# Patient Record
Sex: MALE | Race: BLACK OR AFRICAN AMERICAN | NOT HISPANIC OR LATINO | Employment: OTHER | RURAL
[De-identification: names, ages, dates, MRNs, and addresses within clinical notes are randomized per-mention and may not be internally consistent; named-entity substitution may affect disease eponyms.]

---

## 2019-11-08 ENCOUNTER — HISTORICAL (OUTPATIENT)
Dept: ADMINISTRATIVE | Facility: HOSPITAL | Age: 69
End: 2019-11-08

## 2019-11-11 LAB
LAB AP CLINICAL INFORMATION: NORMAL
LAB AP COMMENTS: NORMAL
LAB AP DIAGNOSIS - HISTORICAL: NORMAL
LAB AP GROSS PATHOLOGY - HISTORICAL: NORMAL
LAB AP SPECIMEN SUBMITTED - HISTORICAL: NORMAL

## 2020-02-18 ENCOUNTER — HISTORICAL (OUTPATIENT)
Dept: ADMINISTRATIVE | Facility: HOSPITAL | Age: 70
End: 2020-02-18

## 2020-02-21 LAB
LAB AP CAP CHECKLIST - HISTORICAL: NORMAL
LAB AP CLINICAL INFORMATION: NORMAL
LAB AP COMMENTS: NORMAL
LAB AP DIAGNOSIS - HISTORICAL: NORMAL
LAB AP GROSS PATHOLOGY - HISTORICAL: NORMAL
LAB AP SPECIMEN SUBMITTED - HISTORICAL: NORMAL

## 2020-10-06 ENCOUNTER — HISTORICAL (OUTPATIENT)
Dept: ADMINISTRATIVE | Facility: HOSPITAL | Age: 70
End: 2020-10-06

## 2020-10-06 LAB — PSA SERPL-MCNC: <0.01 NG/ML (ref 0–4.1)

## 2020-12-16 ENCOUNTER — HISTORICAL (OUTPATIENT)
Dept: ADMINISTRATIVE | Facility: HOSPITAL | Age: 70
End: 2020-12-16

## 2020-12-16 LAB — CRC RECOMMENDATION EXT: NORMAL

## 2020-12-17 LAB

## 2021-01-12 ENCOUNTER — HISTORICAL (OUTPATIENT)
Dept: ADMINISTRATIVE | Facility: HOSPITAL | Age: 71
End: 2021-01-12

## 2021-04-05 ENCOUNTER — OFFICE VISIT (OUTPATIENT)
Dept: UROLOGY | Facility: CLINIC | Age: 71
End: 2021-04-05
Payer: MEDICARE

## 2021-04-05 VITALS
HEIGHT: 73 IN | DIASTOLIC BLOOD PRESSURE: 90 MMHG | TEMPERATURE: 99 F | WEIGHT: 204 LBS | SYSTOLIC BLOOD PRESSURE: 140 MMHG | HEART RATE: 56 BPM | BODY MASS INDEX: 27.04 KG/M2 | OXYGEN SATURATION: 98 %

## 2021-04-05 DIAGNOSIS — N52.9 ERECTILE DYSFUNCTION, UNSPECIFIED ERECTILE DYSFUNCTION TYPE: ICD-10-CM

## 2021-04-05 DIAGNOSIS — C61 PROSTATE CANCER: Primary | ICD-10-CM

## 2021-04-05 PROCEDURE — 3008F BODY MASS INDEX DOCD: CPT | Mod: CPTII,,, | Performed by: UROLOGY

## 2021-04-05 PROCEDURE — 1101F PT FALLS ASSESS-DOCD LE1/YR: CPT | Mod: CPTII,,, | Performed by: UROLOGY

## 2021-04-05 PROCEDURE — 1126F AMNT PAIN NOTED NONE PRSNT: CPT | Mod: ,,, | Performed by: UROLOGY

## 2021-04-05 PROCEDURE — 3288F PR FALLS RISK ASSESSMENT DOCUMENTED: ICD-10-PCS | Mod: CPTII,,, | Performed by: UROLOGY

## 2021-04-05 PROCEDURE — 99999 PR PBB SHADOW E&M-EST. PATIENT-LVL III: CPT | Mod: PBBFAC,,, | Performed by: UROLOGY

## 2021-04-05 PROCEDURE — 99999 PR PBB SHADOW E&M-EST. PATIENT-LVL III: ICD-10-PCS | Mod: PBBFAC,,, | Performed by: UROLOGY

## 2021-04-05 PROCEDURE — 1126F PR PAIN SEVERITY QUANTIFIED, NO PAIN PRESENT: ICD-10-PCS | Mod: ,,, | Performed by: UROLOGY

## 2021-04-05 PROCEDURE — 99213 OFFICE O/P EST LOW 20 MIN: CPT | Mod: PBBFAC | Performed by: UROLOGY

## 2021-04-05 PROCEDURE — 1159F MED LIST DOCD IN RCRD: CPT | Mod: ,,, | Performed by: UROLOGY

## 2021-04-05 PROCEDURE — 1159F PR MEDICATION LIST DOCUMENTED IN MEDICAL RECORD: ICD-10-PCS | Mod: ,,, | Performed by: UROLOGY

## 2021-04-05 PROCEDURE — 99213 PR OFFICE/OUTPT VISIT, EST, LEVL III, 20-29 MIN: ICD-10-PCS | Mod: S$PBB,,, | Performed by: UROLOGY

## 2021-04-05 PROCEDURE — 1101F PR PT FALLS ASSESS DOC 0-1 FALLS W/OUT INJ PAST YR: ICD-10-PCS | Mod: CPTII,,, | Performed by: UROLOGY

## 2021-04-05 PROCEDURE — 99213 OFFICE O/P EST LOW 20 MIN: CPT | Mod: S$PBB,,, | Performed by: UROLOGY

## 2021-04-05 PROCEDURE — 3008F PR BODY MASS INDEX (BMI) DOCUMENTED: ICD-10-PCS | Mod: CPTII,,, | Performed by: UROLOGY

## 2021-04-05 PROCEDURE — 3288F FALL RISK ASSESSMENT DOCD: CPT | Mod: CPTII,,, | Performed by: UROLOGY

## 2021-04-05 RX ORDER — LOSARTAN POTASSIUM 50 MG/1
TABLET ORAL
COMMUNITY
Start: 2021-02-19 | End: 2022-12-08

## 2021-04-05 RX ORDER — METOPROLOL SUCCINATE 50 MG/1
TABLET, EXTENDED RELEASE ORAL
COMMUNITY
Start: 2021-02-19 | End: 2022-11-15 | Stop reason: SDUPTHER

## 2021-04-05 RX ORDER — ASPIRIN 81 MG/1
81 TABLET ORAL DAILY
COMMUNITY
End: 2022-12-08 | Stop reason: SDUPTHER

## 2021-04-05 RX ORDER — ATORVASTATIN CALCIUM 20 MG/1
TABLET, FILM COATED ORAL
COMMUNITY
Start: 2021-02-19 | End: 2021-05-27 | Stop reason: SDUPTHER

## 2021-04-05 RX ORDER — TADALAFIL 20 MG/1
20 TABLET ORAL DAILY
Qty: 30 TABLET | Refills: 6 | Status: SHIPPED | OUTPATIENT
Start: 2021-04-05 | End: 2022-07-20

## 2021-04-05 RX ORDER — CHOLECALCIFEROL (VITAMIN D3) 25 MCG
2000 TABLET ORAL DAILY
COMMUNITY

## 2021-04-05 RX ORDER — LANSOPRAZOLE 30 MG/1
CAPSULE, DELAYED RELEASE ORAL
COMMUNITY
Start: 2021-02-19 | End: 2021-11-08 | Stop reason: SDUPTHER

## 2021-04-19 ENCOUNTER — OFFICE VISIT (OUTPATIENT)
Dept: SPINE | Facility: CLINIC | Age: 71
End: 2021-04-19
Payer: MEDICARE

## 2021-04-19 VITALS — HEIGHT: 70 IN | WEIGHT: 205 LBS | BODY MASS INDEX: 29.35 KG/M2

## 2021-04-19 DIAGNOSIS — M16.11 ARTHRITIS OF RIGHT HIP: ICD-10-CM

## 2021-04-19 DIAGNOSIS — M16.12 ARTHRITIS OF LEFT HIP: ICD-10-CM

## 2021-04-19 DIAGNOSIS — M41.56 SCOLIOSIS OF LUMBAR REGION DUE TO DEGENERATIVE DISEASE OF SPINE IN ADULT: Primary | ICD-10-CM

## 2021-04-19 PROCEDURE — 99214 OFFICE O/P EST MOD 30 MIN: CPT | Mod: S$PBB,,, | Performed by: ORTHOPAEDIC SURGERY

## 2021-04-19 PROCEDURE — 99999 PR PBB SHADOW E&M-EST. PATIENT-LVL III: CPT | Mod: PBBFAC,,, | Performed by: ORTHOPAEDIC SURGERY

## 2021-04-19 PROCEDURE — 99214 PR OFFICE/OUTPT VISIT, EST, LEVL IV, 30-39 MIN: ICD-10-PCS | Mod: S$PBB,,, | Performed by: ORTHOPAEDIC SURGERY

## 2021-04-19 PROCEDURE — 99999 PR PBB SHADOW E&M-EST. PATIENT-LVL III: ICD-10-PCS | Mod: PBBFAC,,, | Performed by: ORTHOPAEDIC SURGERY

## 2021-04-19 PROCEDURE — 3008F BODY MASS INDEX DOCD: CPT | Mod: CPTII,,, | Performed by: ORTHOPAEDIC SURGERY

## 2021-04-19 PROCEDURE — 3008F PR BODY MASS INDEX (BMI) DOCUMENTED: ICD-10-PCS | Mod: CPTII,,, | Performed by: ORTHOPAEDIC SURGERY

## 2021-04-19 PROCEDURE — 99213 OFFICE O/P EST LOW 20 MIN: CPT | Mod: PBBFAC | Performed by: ORTHOPAEDIC SURGERY

## 2021-04-22 ENCOUNTER — TELEPHONE (OUTPATIENT)
Dept: UROLOGY | Facility: CLINIC | Age: 71
End: 2021-04-22

## 2021-05-26 PROBLEM — I10 HYPERTENSION: Status: ACTIVE | Noted: 2021-05-26

## 2021-05-26 PROBLEM — M19.90 OSTEOARTHRITIS: Status: ACTIVE | Noted: 2021-05-26

## 2021-05-26 PROBLEM — K21.9 GASTROESOPHAGEAL REFLUX DISEASE: Status: ACTIVE | Noted: 2021-05-26

## 2021-05-26 PROBLEM — E78.5 DYSLIPIDEMIA: Status: ACTIVE | Noted: 2021-05-26

## 2021-05-26 RX ORDER — TRAVOPROST OPHTHALMIC SOLUTION 0.04 MG/ML
SOLUTION OPHTHALMIC
COMMUNITY
Start: 2021-03-01

## 2021-05-26 RX ORDER — PREGABALIN 50 MG/1
1 CAPSULE ORAL 2 TIMES DAILY
COMMUNITY
Start: 2021-01-28 | End: 2022-12-08

## 2021-05-26 RX ORDER — GABAPENTIN 300 MG/1
1 CAPSULE ORAL 3 TIMES DAILY
COMMUNITY
Start: 2021-01-19 | End: 2022-12-08

## 2021-05-26 RX ORDER — TIZANIDINE 4 MG/1
1 TABLET ORAL EVERY 8 HOURS PRN
COMMUNITY
Start: 2020-12-28 | End: 2022-12-08

## 2021-05-26 RX ORDER — TRAMADOL HYDROCHLORIDE AND ACETAMINOPHEN 37.5; 325 MG/1; MG/1
1 TABLET, FILM COATED ORAL EVERY 12 HOURS PRN
COMMUNITY
Start: 2020-12-28 | End: 2022-12-08

## 2021-05-27 ENCOUNTER — OFFICE VISIT (OUTPATIENT)
Dept: PRIMARY CARE CLINIC | Facility: CLINIC | Age: 71
End: 2021-05-27
Payer: MEDICARE

## 2021-05-27 VITALS
DIASTOLIC BLOOD PRESSURE: 80 MMHG | RESPIRATION RATE: 18 BRPM | WEIGHT: 201 LBS | HEIGHT: 70 IN | SYSTOLIC BLOOD PRESSURE: 122 MMHG | BODY MASS INDEX: 28.77 KG/M2 | OXYGEN SATURATION: 97 %

## 2021-05-27 DIAGNOSIS — I10 HYPERTENSION, UNSPECIFIED TYPE: ICD-10-CM

## 2021-05-27 DIAGNOSIS — M19.90 OSTEOARTHRITIS, UNSPECIFIED OSTEOARTHRITIS TYPE, UNSPECIFIED SITE: ICD-10-CM

## 2021-05-27 DIAGNOSIS — E78.5 DYSLIPIDEMIA: Primary | ICD-10-CM

## 2021-05-27 DIAGNOSIS — K21.9 GASTROESOPHAGEAL REFLUX DISEASE, UNSPECIFIED WHETHER ESOPHAGITIS PRESENT: ICD-10-CM

## 2021-05-27 DIAGNOSIS — Z12.5 PROSTATE CANCER SCREENING: ICD-10-CM

## 2021-05-27 PROCEDURE — 3288F PR FALLS RISK ASSESSMENT DOCUMENTED: ICD-10-PCS | Mod: CPTII,,, | Performed by: FAMILY MEDICINE

## 2021-05-27 PROCEDURE — 99214 PR OFFICE/OUTPT VISIT, EST, LEVL IV, 30-39 MIN: ICD-10-PCS | Mod: ,,, | Performed by: FAMILY MEDICINE

## 2021-05-27 PROCEDURE — 3288F FALL RISK ASSESSMENT DOCD: CPT | Mod: CPTII,,, | Performed by: FAMILY MEDICINE

## 2021-05-27 PROCEDURE — 3008F BODY MASS INDEX DOCD: CPT | Mod: CPTII,,, | Performed by: FAMILY MEDICINE

## 2021-05-27 PROCEDURE — 99214 OFFICE O/P EST MOD 30 MIN: CPT | Mod: ,,, | Performed by: FAMILY MEDICINE

## 2021-05-27 PROCEDURE — 1101F PT FALLS ASSESS-DOCD LE1/YR: CPT | Mod: CPTII,,, | Performed by: FAMILY MEDICINE

## 2021-05-27 PROCEDURE — 1101F PR PT FALLS ASSESS DOC 0-1 FALLS W/OUT INJ PAST YR: ICD-10-PCS | Mod: CPTII,,, | Performed by: FAMILY MEDICINE

## 2021-05-27 PROCEDURE — 3008F PR BODY MASS INDEX (BMI) DOCUMENTED: ICD-10-PCS | Mod: CPTII,,, | Performed by: FAMILY MEDICINE

## 2021-05-27 RX ORDER — ATORVASTATIN CALCIUM 20 MG/1
20 TABLET, FILM COATED ORAL NIGHTLY
Qty: 90 TABLET | Refills: 3 | Status: SHIPPED | OUTPATIENT
Start: 2021-05-27

## 2021-07-06 ENCOUNTER — TELEPHONE (OUTPATIENT)
Dept: UROLOGY | Facility: CLINIC | Age: 71
End: 2021-07-06

## 2021-07-14 ENCOUNTER — OFFICE VISIT (OUTPATIENT)
Dept: UROLOGY | Facility: CLINIC | Age: 71
End: 2021-07-14
Payer: MEDICARE

## 2021-07-14 VITALS
TEMPERATURE: 99 F | OXYGEN SATURATION: 99 % | WEIGHT: 201 LBS | DIASTOLIC BLOOD PRESSURE: 87 MMHG | SYSTOLIC BLOOD PRESSURE: 138 MMHG | HEART RATE: 51 BPM | HEIGHT: 70 IN | BODY MASS INDEX: 28.77 KG/M2

## 2021-07-14 DIAGNOSIS — N52.9 ERECTILE DYSFUNCTION, UNSPECIFIED ERECTILE DYSFUNCTION TYPE: ICD-10-CM

## 2021-07-14 DIAGNOSIS — C61 PROSTATE CANCER: Primary | ICD-10-CM

## 2021-07-14 PROCEDURE — 1101F PR PT FALLS ASSESS DOC 0-1 FALLS W/OUT INJ PAST YR: ICD-10-PCS | Mod: CPTII,,, | Performed by: UROLOGY

## 2021-07-14 PROCEDURE — 3288F FALL RISK ASSESSMENT DOCD: CPT | Mod: CPTII,,, | Performed by: UROLOGY

## 2021-07-14 PROCEDURE — 1101F PT FALLS ASSESS-DOCD LE1/YR: CPT | Mod: CPTII,,, | Performed by: UROLOGY

## 2021-07-14 PROCEDURE — 99213 PR OFFICE/OUTPT VISIT, EST, LEVL III, 20-29 MIN: ICD-10-PCS | Mod: S$PBB,,, | Performed by: UROLOGY

## 2021-07-14 PROCEDURE — 99213 OFFICE O/P EST LOW 20 MIN: CPT | Mod: S$PBB,,, | Performed by: UROLOGY

## 2021-07-14 PROCEDURE — 1159F PR MEDICATION LIST DOCUMENTED IN MEDICAL RECORD: ICD-10-PCS | Mod: ,,, | Performed by: UROLOGY

## 2021-07-14 PROCEDURE — 99214 OFFICE O/P EST MOD 30 MIN: CPT | Mod: PBBFAC | Performed by: UROLOGY

## 2021-07-14 PROCEDURE — 3008F BODY MASS INDEX DOCD: CPT | Mod: CPTII,,, | Performed by: UROLOGY

## 2021-07-14 PROCEDURE — 1159F MED LIST DOCD IN RCRD: CPT | Mod: ,,, | Performed by: UROLOGY

## 2021-07-14 PROCEDURE — 3288F PR FALLS RISK ASSESSMENT DOCUMENTED: ICD-10-PCS | Mod: CPTII,,, | Performed by: UROLOGY

## 2021-07-14 PROCEDURE — 3008F PR BODY MASS INDEX (BMI) DOCUMENTED: ICD-10-PCS | Mod: CPTII,,, | Performed by: UROLOGY

## 2021-07-14 RX ORDER — SILDENAFIL CITRATE 20 MG/1
20 TABLET ORAL 3 TIMES DAILY
Qty: 45 TABLET | Refills: 11 | Status: SHIPPED | OUTPATIENT
Start: 2021-07-14 | End: 2022-07-20

## 2021-10-19 ENCOUNTER — CLINICAL SUPPORT (OUTPATIENT)
Dept: PRIMARY CARE CLINIC | Facility: CLINIC | Age: 71
End: 2021-10-19
Payer: MEDICARE

## 2021-10-19 DIAGNOSIS — Z23 NEED FOR VACCINATION: Primary | ICD-10-CM

## 2021-10-19 DIAGNOSIS — I10 HYPERTENSION, UNSPECIFIED TYPE: ICD-10-CM

## 2021-10-19 DIAGNOSIS — E78.5 DYSLIPIDEMIA: ICD-10-CM

## 2021-10-19 DIAGNOSIS — K21.9 GASTROESOPHAGEAL REFLUX DISEASE, UNSPECIFIED WHETHER ESOPHAGITIS PRESENT: ICD-10-CM

## 2021-10-19 DIAGNOSIS — M19.90 OSTEOARTHRITIS, UNSPECIFIED OSTEOARTHRITIS TYPE, UNSPECIFIED SITE: ICD-10-CM

## 2021-10-19 PROCEDURE — 90662 FLU VACCINE - QUADRIVALENT - HIGH DOSE (65+) PRESERVATIVE FREE IM: ICD-10-PCS | Mod: ,,, | Performed by: FAMILY MEDICINE

## 2021-10-19 PROCEDURE — G0008 ADMIN INFLUENZA VIRUS VAC: HCPCS | Mod: ,,, | Performed by: FAMILY MEDICINE

## 2021-10-19 PROCEDURE — 90662 IIV NO PRSV INCREASED AG IM: CPT | Mod: ,,, | Performed by: FAMILY MEDICINE

## 2021-10-19 PROCEDURE — G0008 FLU VACCINE - QUADRIVALENT - HIGH DOSE (65+) PRESERVATIVE FREE IM: ICD-10-PCS | Mod: ,,, | Performed by: FAMILY MEDICINE

## 2021-11-08 RX ORDER — LANSOPRAZOLE 30 MG/1
30 CAPSULE, DELAYED RELEASE ORAL DAILY
Qty: 90 CAPSULE | Refills: 3 | Status: SHIPPED | OUTPATIENT
Start: 2021-11-08 | End: 2022-06-08 | Stop reason: SDUPTHER

## 2021-11-29 ENCOUNTER — OFFICE VISIT (OUTPATIENT)
Dept: PRIMARY CARE CLINIC | Facility: CLINIC | Age: 71
End: 2021-11-29
Payer: MEDICARE

## 2021-11-29 VITALS
RESPIRATION RATE: 16 BRPM | WEIGHT: 207.38 LBS | SYSTOLIC BLOOD PRESSURE: 134 MMHG | HEIGHT: 70 IN | DIASTOLIC BLOOD PRESSURE: 60 MMHG | TEMPERATURE: 98 F | BODY MASS INDEX: 29.69 KG/M2 | OXYGEN SATURATION: 100 % | HEART RATE: 62 BPM

## 2021-11-29 DIAGNOSIS — E78.5 DYSLIPIDEMIA: ICD-10-CM

## 2021-11-29 DIAGNOSIS — I10 HYPERTENSION, UNSPECIFIED TYPE: Primary | ICD-10-CM

## 2021-11-29 DIAGNOSIS — M15.3 OTHER SECONDARY OSTEOARTHRITIS OF MULTIPLE SITES: ICD-10-CM

## 2021-11-29 DIAGNOSIS — K21.00 GASTROESOPHAGEAL REFLUX DISEASE WITH ESOPHAGITIS, UNSPECIFIED WHETHER HEMORRHAGE: ICD-10-CM

## 2021-11-29 PROCEDURE — 99214 PR OFFICE/OUTPT VISIT, EST, LEVL IV, 30-39 MIN: ICD-10-PCS | Mod: ,,, | Performed by: FAMILY MEDICINE

## 2021-11-29 PROCEDURE — 4010F ACE/ARB THERAPY RXD/TAKEN: CPT | Mod: ,,, | Performed by: FAMILY MEDICINE

## 2021-11-29 PROCEDURE — 4010F PR ACE/ARB THEARPY RXD/TAKEN: ICD-10-PCS | Mod: ,,, | Performed by: FAMILY MEDICINE

## 2021-11-29 PROCEDURE — 99214 OFFICE O/P EST MOD 30 MIN: CPT | Mod: ,,, | Performed by: FAMILY MEDICINE

## 2022-01-13 ENCOUNTER — TELEPHONE (OUTPATIENT)
Dept: UROLOGY | Facility: CLINIC | Age: 72
End: 2022-01-13
Payer: MEDICARE

## 2022-01-13 DIAGNOSIS — C61 PROSTATE CANCER: Primary | ICD-10-CM

## 2022-01-13 NOTE — TELEPHONE ENCOUNTER
----- Message from Abi Ambriz sent at 1/13/2022  9:59 AM CST -----  Regarding: PSA  Pt has appt Tuesday needs PSA ordered .   I called and spoke with the wife.  Informed her that the order has been placed for diagnostic PSA for his upcoming appointment with LAZARO Guido.  He can go to lab anytime between 7am and 4:30pm Monday through Thursday and 7am to 2pm on fridays.  She voiced understanding.

## 2022-01-18 ENCOUNTER — OFFICE VISIT (OUTPATIENT)
Dept: UROLOGY | Facility: CLINIC | Age: 72
End: 2022-01-18
Payer: MEDICARE

## 2022-01-18 VITALS
HEART RATE: 53 BPM | DIASTOLIC BLOOD PRESSURE: 100 MMHG | TEMPERATURE: 98 F | WEIGHT: 205 LBS | BODY MASS INDEX: 27.77 KG/M2 | OXYGEN SATURATION: 98 % | HEIGHT: 72 IN | SYSTOLIC BLOOD PRESSURE: 150 MMHG

## 2022-01-18 DIAGNOSIS — C61 CANCER OF PROSTATE: Primary | ICD-10-CM

## 2022-01-18 DIAGNOSIS — R97.20 ELEVATED PSA: ICD-10-CM

## 2022-01-18 DIAGNOSIS — I10 HYPERTENSION, UNSPECIFIED TYPE: ICD-10-CM

## 2022-01-18 DIAGNOSIS — Z12.5 ENCOUNTER FOR PROSTATE CANCER SCREENING: ICD-10-CM

## 2022-01-18 PROCEDURE — 3008F PR BODY MASS INDEX (BMI) DOCUMENTED: ICD-10-PCS | Mod: CPTII,,, | Performed by: NURSE PRACTITIONER

## 2022-01-18 PROCEDURE — 4010F ACE/ARB THERAPY RXD/TAKEN: CPT | Mod: CPTII,,, | Performed by: NURSE PRACTITIONER

## 2022-01-18 PROCEDURE — 1126F AMNT PAIN NOTED NONE PRSNT: CPT | Mod: CPTII,,, | Performed by: NURSE PRACTITIONER

## 2022-01-18 PROCEDURE — 3288F PR FALLS RISK ASSESSMENT DOCUMENTED: ICD-10-PCS | Mod: CPTII,,, | Performed by: NURSE PRACTITIONER

## 2022-01-18 PROCEDURE — 3080F PR MOST RECENT DIASTOLIC BLOOD PRESSURE >= 90 MM HG: ICD-10-PCS | Mod: CPTII,,, | Performed by: NURSE PRACTITIONER

## 2022-01-18 PROCEDURE — 1159F PR MEDICATION LIST DOCUMENTED IN MEDICAL RECORD: ICD-10-PCS | Mod: CPTII,,, | Performed by: NURSE PRACTITIONER

## 2022-01-18 PROCEDURE — 3008F BODY MASS INDEX DOCD: CPT | Mod: CPTII,,, | Performed by: NURSE PRACTITIONER

## 2022-01-18 PROCEDURE — 99215 OFFICE O/P EST HI 40 MIN: CPT | Mod: PBBFAC | Performed by: NURSE PRACTITIONER

## 2022-01-18 PROCEDURE — 1126F PR PAIN SEVERITY QUANTIFIED, NO PAIN PRESENT: ICD-10-PCS | Mod: CPTII,,, | Performed by: NURSE PRACTITIONER

## 2022-01-18 PROCEDURE — 4010F PR ACE/ARB THEARPY RXD/TAKEN: ICD-10-PCS | Mod: CPTII,,, | Performed by: NURSE PRACTITIONER

## 2022-01-18 PROCEDURE — 1101F PT FALLS ASSESS-DOCD LE1/YR: CPT | Mod: CPTII,,, | Performed by: NURSE PRACTITIONER

## 2022-01-18 PROCEDURE — 1101F PR PT FALLS ASSESS DOC 0-1 FALLS W/OUT INJ PAST YR: ICD-10-PCS | Mod: CPTII,,, | Performed by: NURSE PRACTITIONER

## 2022-01-18 PROCEDURE — 1160F RVW MEDS BY RX/DR IN RCRD: CPT | Mod: CPTII,,, | Performed by: NURSE PRACTITIONER

## 2022-01-18 PROCEDURE — 3288F FALL RISK ASSESSMENT DOCD: CPT | Mod: CPTII,,, | Performed by: NURSE PRACTITIONER

## 2022-01-18 PROCEDURE — 1159F MED LIST DOCD IN RCRD: CPT | Mod: CPTII,,, | Performed by: NURSE PRACTITIONER

## 2022-01-18 PROCEDURE — 1160F PR REVIEW ALL MEDS BY PRESCRIBER/CLIN PHARMACIST DOCUMENTED: ICD-10-PCS | Mod: CPTII,,, | Performed by: NURSE PRACTITIONER

## 2022-01-18 PROCEDURE — 3077F SYST BP >= 140 MM HG: CPT | Mod: CPTII,,, | Performed by: NURSE PRACTITIONER

## 2022-01-18 PROCEDURE — 99214 PR OFFICE/OUTPT VISIT, EST, LEVL IV, 30-39 MIN: ICD-10-PCS | Mod: S$PBB,,, | Performed by: NURSE PRACTITIONER

## 2022-01-18 PROCEDURE — 3077F PR MOST RECENT SYSTOLIC BLOOD PRESSURE >= 140 MM HG: ICD-10-PCS | Mod: CPTII,,, | Performed by: NURSE PRACTITIONER

## 2022-01-18 PROCEDURE — 3080F DIAST BP >= 90 MM HG: CPT | Mod: CPTII,,, | Performed by: NURSE PRACTITIONER

## 2022-01-18 PROCEDURE — 51798 US URINE CAPACITY MEASURE: CPT | Mod: PBBFAC | Performed by: NURSE PRACTITIONER

## 2022-01-18 PROCEDURE — 99214 OFFICE O/P EST MOD 30 MIN: CPT | Mod: S$PBB,,, | Performed by: NURSE PRACTITIONER

## 2022-01-18 RX ORDER — METOPROLOL SUCCINATE 50 MG/1
1 TABLET, EXTENDED RELEASE ORAL DAILY
COMMUNITY
End: 2022-12-08 | Stop reason: SDUPTHER

## 2022-01-18 RX ORDER — LOSARTAN POTASSIUM 50 MG/1
1 TABLET ORAL DAILY
COMMUNITY
End: 2022-12-08

## 2022-01-18 RX ORDER — NAPROXEN SODIUM 220 MG/1
TABLET, FILM COATED ORAL
COMMUNITY

## 2022-01-18 RX ORDER — ATORVASTATIN CALCIUM 20 MG/1
TABLET, FILM COATED ORAL
COMMUNITY
End: 2022-12-08 | Stop reason: SDUPTHER

## 2022-01-18 NOTE — ASSESSMENT & PLAN NOTE
· Uncontrolled, rechecked manually.  Patient asymptomatic  · Patient to call Dr. Mae today for recommendation and further evaluation.  · Keep BP log

## 2022-01-18 NOTE — ASSESSMENT & PLAN NOTE
Lab Results   Component Value Date    PSA <0.010 01/17/2022    PSA <0.010 11/29/2021    PSA <0.010 07/06/2021

## 2022-01-18 NOTE — PROGRESS NOTES
Subjective:       Patient ID: Shad Olvera is a 71 y.o. male.    Chief Complaint: Other (6 month followup appointment for prostate cancer--former Dr Melara pt---New for LAZARO Guido--brought his meds)    7/14/2021 - GUANAKITO:    psa is 0.     Underwent Cap 3+4 s/p RALP 2/18/20    Previous notes below    69 yoweek post op. no new issues. pathology was pT2Nx0, final 3+4 with 26%, negative margins. No leakage. No pads. Attempted cialis but no benefit. incision have healed well.      cialis 20 mg prn.    POC:    x sildenafil 100mg prn  Follow up 6 months with PSA prior    They will follow up in [place]  ---------------------------------------        4/5/2021:   HPI  Here before his scheduled follow up. Wants to discuss ED. Failed max viagra. Wife present at exam.     Plan:       68 yo 6 week post op. no new issues. pathology was pT2Nx0, final 3+4 with 26%, negative margins. going through 1 ppd. not sexually active. incision have healed well. psa pending today.  2/20.. psa has been undetectable. Did not get psa today    cialis 20 mg prn.     PSA in 3 months. Then q 6 months until 5 years out. He will reach out sooner if cialis doesn't work. Offered injection therapy. -----------------    PREVIOUS NOTE:    68 yo here with his wife for evaluation of robotic prostatectomy. Patient was initially planning on having surgery with Dr. Carrasco but his insurance does not cover travel expense. PSA was 5.5 the time of diagnosis his prostate was between 30 and 40 g. He has relatively normal erectile function with the mild IP assess score    assessment  Cap 3+4 s/p RALP 2/18/20    plan  -PSA is 6 months.   ____________________________________________________________________________________________________________________________  _________________________________________________________________________________________________________________________    Mr. Olvera is a pleasant 70 yo AA gentleman who is here today with his wife, for 6 month f/u  appt for CAP, GS 3+4.  PSA 5.5 at diagnosis, RALP 2/18/2020.  PSA prior to visit remains undetectable.  Patient denies voiding complaints, and feels his urgency and nocturia is acceptable.  IPSS 7, PVR 0 ml.    Lab Results       Component                Value               Date                       PSA                      <0.010              01/17/2022                 PSA                      <0.010              11/29/2021                 PSA                      <0.010              07/06/2021   ------------(1/18/2021)-----------------------------               Review of Systems   Constitutional: Negative.    Eyes: Negative for visual disturbance.   Respiratory: Negative.    Cardiovascular: Negative.    Gastrointestinal: Negative.    Endocrine: Negative.    Genitourinary: Positive for urgency. Negative for decreased urine volume, difficulty urinating, dysuria, enuresis, flank pain, frequency, genital sores, hematuria, penile discharge, penile pain, penile swelling, scrotal swelling and testicular pain.        Occassional urgency, sleeps all night when fluids controlled in the evening.   Musculoskeletal: Negative.    Allergic/Immunologic: Negative.    Neurological: Negative.    Hematological: Negative.    Psychiatric/Behavioral: Negative.        Objective:      Physical Exam  Vitals and nursing note reviewed.   Constitutional:       General: He is not in acute distress.     Appearance: Normal appearance. He is not ill-appearing, toxic-appearing or diaphoretic.   Eyes:      General: No scleral icterus.  Cardiovascular:      Rate and Rhythm: Normal rate. Rhythm irregular.      Pulses: Normal pulses.      Heart sounds: Normal heart sounds.   Pulmonary:      Effort: Pulmonary effort is normal.      Breath sounds: Normal breath sounds.   Abdominal:      General: Bowel sounds are normal. There is no distension.      Palpations: Abdomen is soft.      Tenderness: There is no abdominal tenderness. There is no right CVA  tenderness or left CVA tenderness.   Musculoskeletal:      Right lower leg: No edema.      Left lower leg: No edema.   Skin:     General: Skin is warm and dry.      Coloration: Skin is not jaundiced or pale.   Neurological:      General: No focal deficit present.      Mental Status: He is alert and oriented to person, place, and time.   Psychiatric:         Mood and Affect: Mood normal.         Behavior: Behavior normal.         Judgment: Judgment normal.         Assessment:       1. Cancer of prostate    2. Elevated PSA    3. Encounter for prostate cancer screening    4. Hypertension, unspecified type        Plan:       Elevated PSA  Lab Results   Component Value Date    PSA <0.010 01/17/2022    PSA <0.010 11/29/2021    PSA <0.010 07/06/2021       Hypertension  · Uncontrolled, rechecked manually.  Patient asymptomatic  · Patient to call Dr. Mae today for recommendation and further evaluation.  · Keep BP log    Cancer of prostate  · Continue q 6 mos rechecks with PSA  PSA Total (ng/mL)   Date Value   01/17/2022 <0.010

## 2022-01-18 NOTE — PATIENT INSTRUCTIONS
Patient Education       High Blood Pressure ED   General Information   During your visit to the Emergency Department (ED), the doctors found your blood pressure was high. The medical name for high blood pressure is hypertension. Your blood pressure is measured with 2 numbers. For example, you may hear the staff say your blood pressure is 130 over 80. High blood pressure puts you at risk for heart attack, stroke, and kidney disease.  What care is needed at home?   · Call your regular doctor to let them know you were in the ED. Make a follow-up appointment if you were told to. You may need to see your doctor a few times before they can diagnose hypertension.  · Take all your medicines as ordered. Do not stop taking any of your regular medicines without talking to your doctor.  · Learn how to check your blood pressure at home, if your doctor suggests you do this.  When do I need to get emergency help?   · Call for an ambulance right away if:   ? You have signs of a heart attack, which may include:  § Severe chest pain, pressure, or discomfort with:  § Breathing trouble, sweating, upset stomach, or cold, clammy skin.  § Pain in your arms, back, or jaw.  § Worse pain with activity like walking up stairs.  § Fast or irregular heartbeat.  § Feeling dizzy, faint, or weak.  ? You have signs of stroke like sudden:  § Numbness or weakness of the face, arm, or leg, especially on one side of the body.  § Confusion, trouble speaking, or understanding.  § Trouble seeing in one or both eyes.  § Trouble walking, dizziness, loss of balance, or coordination.  § Severe headache with no known cause.  ? You have a seizure or pass out.  ? You have a severe headache with an upset stomach or throwing up.  ? You have sudden, severe back pain.  When do I need to call the doctor?   · You have 2 home blood pressure readings higher than 180/120.  · Your urine is brown or bloody.  · You have new or worsening symptoms.  Last Reviewed Date    2021-04-28  Consumer Information Use and Disclaimer   This information is not specific medical advice and does not replace information you receive from your health care provider. This is only a brief summary of general information. It does NOT include all information about conditions, illnesses, injuries, tests, procedures, treatments, therapies, discharge instructions or life-style choices that may apply to you. You must talk with your health care provider for complete information about your health and treatment options. This information should not be used to decide whether or not to accept your health care providers advice, instructions or recommendations. Only your health care provider has the knowledge and training to provide advice that is right for you.  Copyright   Copyright © 2021 [employer] and its affiliates and/or licensors. All rights reserved.

## 2022-02-15 RX ORDER — METOPROLOL TARTRATE 50 MG/1
50 TABLET ORAL DAILY
Qty: 90 TABLET | Refills: 3 | Status: SHIPPED | OUTPATIENT
Start: 2022-02-15 | End: 2022-12-08 | Stop reason: SDUPTHER

## 2022-06-08 ENCOUNTER — OFFICE VISIT (OUTPATIENT)
Dept: PRIMARY CARE CLINIC | Facility: CLINIC | Age: 72
End: 2022-06-08
Payer: MEDICARE

## 2022-06-08 VITALS
DIASTOLIC BLOOD PRESSURE: 80 MMHG | WEIGHT: 202 LBS | HEART RATE: 58 BPM | SYSTOLIC BLOOD PRESSURE: 130 MMHG | HEIGHT: 73 IN | RESPIRATION RATE: 16 BRPM | BODY MASS INDEX: 26.77 KG/M2

## 2022-06-08 DIAGNOSIS — C61 CANCER OF PROSTATE: ICD-10-CM

## 2022-06-08 DIAGNOSIS — R97.20 ELEVATED PSA: ICD-10-CM

## 2022-06-08 DIAGNOSIS — Z12.5 ENCOUNTER FOR PROSTATE CANCER SCREENING: ICD-10-CM

## 2022-06-08 DIAGNOSIS — I10 HYPERTENSION, UNSPECIFIED TYPE: Primary | ICD-10-CM

## 2022-06-08 DIAGNOSIS — E78.5 DYSLIPIDEMIA: ICD-10-CM

## 2022-06-08 PROCEDURE — 4010F ACE/ARB THERAPY RXD/TAKEN: CPT | Mod: ,,, | Performed by: FAMILY MEDICINE

## 2022-06-08 PROCEDURE — 1101F PR PT FALLS ASSESS DOC 0-1 FALLS W/OUT INJ PAST YR: ICD-10-PCS | Mod: ,,, | Performed by: FAMILY MEDICINE

## 2022-06-08 PROCEDURE — 4010F PR ACE/ARB THEARPY RXD/TAKEN: ICD-10-PCS | Mod: ,,, | Performed by: FAMILY MEDICINE

## 2022-06-08 PROCEDURE — 1159F PR MEDICATION LIST DOCUMENTED IN MEDICAL RECORD: ICD-10-PCS | Mod: ,,, | Performed by: FAMILY MEDICINE

## 2022-06-08 PROCEDURE — 99214 OFFICE O/P EST MOD 30 MIN: CPT | Mod: ,,, | Performed by: FAMILY MEDICINE

## 2022-06-08 PROCEDURE — 1159F MED LIST DOCD IN RCRD: CPT | Mod: ,,, | Performed by: FAMILY MEDICINE

## 2022-06-08 PROCEDURE — 3288F PR FALLS RISK ASSESSMENT DOCUMENTED: ICD-10-PCS | Mod: ,,, | Performed by: FAMILY MEDICINE

## 2022-06-08 PROCEDURE — 3075F SYST BP GE 130 - 139MM HG: CPT | Mod: ,,, | Performed by: FAMILY MEDICINE

## 2022-06-08 PROCEDURE — 3008F BODY MASS INDEX DOCD: CPT | Mod: ,,, | Performed by: FAMILY MEDICINE

## 2022-06-08 PROCEDURE — 3079F PR MOST RECENT DIASTOLIC BLOOD PRESSURE 80-89 MM HG: ICD-10-PCS | Mod: ,,, | Performed by: FAMILY MEDICINE

## 2022-06-08 PROCEDURE — 3288F FALL RISK ASSESSMENT DOCD: CPT | Mod: ,,, | Performed by: FAMILY MEDICINE

## 2022-06-08 PROCEDURE — 1101F PT FALLS ASSESS-DOCD LE1/YR: CPT | Mod: ,,, | Performed by: FAMILY MEDICINE

## 2022-06-08 PROCEDURE — 3075F PR MOST RECENT SYSTOLIC BLOOD PRESS GE 130-139MM HG: ICD-10-PCS | Mod: ,,, | Performed by: FAMILY MEDICINE

## 2022-06-08 PROCEDURE — 3079F DIAST BP 80-89 MM HG: CPT | Mod: ,,, | Performed by: FAMILY MEDICINE

## 2022-06-08 PROCEDURE — 99214 PR OFFICE/OUTPT VISIT, EST, LEVL IV, 30-39 MIN: ICD-10-PCS | Mod: ,,, | Performed by: FAMILY MEDICINE

## 2022-06-08 PROCEDURE — 3008F PR BODY MASS INDEX (BMI) DOCUMENTED: ICD-10-PCS | Mod: ,,, | Performed by: FAMILY MEDICINE

## 2022-06-08 RX ORDER — LANSOPRAZOLE 30 MG/1
30 CAPSULE, DELAYED RELEASE ORAL DAILY
Qty: 90 CAPSULE | Refills: 3 | Status: SHIPPED | OUTPATIENT
Start: 2022-06-08 | End: 2022-11-15 | Stop reason: SDUPTHER

## 2022-06-08 NOTE — PROGRESS NOTES
Subjective:      Patient ID: Shad Olvera is a 72 y.o. male.    Chief Complaint: No chief complaint on file.    Shad Olvera a 72 y.o. male presents for follow up on all regular problems which are reviewed and discussed.     Problem List Items Addressed This Visit        Cardiac/Vascular    Hypertension - Primary    Dyslipidemia       Renal/    Elevated PSA    RESOLVED: Encounter for prostate cancer screening       Oncology    Cancer of prostate    Overview     CAP, GS 3+4.  PSA 5.5 at diagnosis, RALP 2/18/2020.  PSA prior to visit remains undetectable.                 Past Medical History:  Past Medical History:   Diagnosis Date    Cancer of prostate     Elevated PSA     Hypertension      Past Surgical History:   Procedure Laterality Date    PROSTATE SURGERY      TRIGGER FINGER RELEASE       Review of patient's allergies indicates:  No Known Allergies  Current Outpatient Medications on File Prior to Visit   Medication Sig Dispense Refill    aspirin (ECOTRIN) 81 MG EC tablet Take 81 mg by mouth once daily.      atorvastatin (LIPITOR) 20 MG tablet Take 1 tablet (20 mg total) by mouth every evening. 90 tablet 3    calcium carbonate/vitamin D3 (VITAMIN D-3 ORAL) Take by mouth.      metoprolol succinate (TOPROL-XL) 50 MG 24 hr tablet       [DISCONTINUED] lansoprazole (PREVACID) 30 MG capsule Take 1 capsule (30 mg total) by mouth once daily. 90 capsule 3    aspirin 81 MG Chew 1 tablet      atorvastatin (LIPITOR) 20 MG tablet 1 tablet      gabapentin (NEURONTIN) 300 MG capsule Take 1 capsule by mouth 3 (three) times daily.      losartan (COZAAR) 50 MG tablet       losartan (COZAAR) 50 MG tablet Take 1 tablet by mouth once daily.      metoprolol succinate (TOPROL-XL) 50 MG 24 hr tablet Take 1 tablet by mouth once daily.      metoprolol tartrate (LOPRESSOR) 50 MG tablet Take 1 tablet (50 mg total) by mouth once daily. (Patient not taking: Reported on 6/8/2022) 90 tablet 3    pregabalin (LYRICA) 50 MG capsule  Take 1 capsule by mouth 2 (two) times a day.      sildenafil (REVATIO) 20 mg Tab Take 1 tablet (20 mg total) by mouth 3 (three) times daily. (Patient not taking: Reported on 6/8/2022) 45 tablet 11    tadalafiL (CIALIS) 20 MG Tab Take 1 tablet (20 mg total) by mouth once daily. 30 tablet 6    tiZANidine (ZANAFLEX) 4 MG tablet Take 1 tablet by mouth every 8 (eight) hours as needed.      tramadol-acetaminophen 37.5-325 mg (ULTRACET) 37.5-325 mg Tab Take 1 tablet by mouth every 12 (twelve) hours as needed.      travoprost (TRAVATAN Z) 0.004 % ophthalmic solution       vitamin D (VITAMIN D3) 1000 units Tab Take 2,000 Units by mouth once daily.       No current facility-administered medications on file prior to visit.     Social History     Socioeconomic History    Marital status:    Tobacco Use    Smoking status: Never Smoker    Smokeless tobacco: Never Used   Substance and Sexual Activity    Alcohol use: Never    Drug use: Never    Sexual activity: Not Currently     Family History   Problem Relation Age of Onset    Heart disease Father     Stroke Father     Heart disease Mother     Diabetes Mother        Review of Systems   Constitutional: Negative.  Negative for activity change and unexpected weight change.   HENT: Negative for congestion, ear pain, hearing loss, nosebleeds, rhinorrhea and trouble swallowing.    Eyes: Negative for pain, discharge, itching and visual disturbance.   Respiratory: Negative for chest tightness and wheezing.    Cardiovascular: Negative for chest pain and palpitations.   Gastrointestinal: Negative for abdominal distention, blood in stool, constipation, diarrhea and vomiting.   Endocrine: Negative for cold intolerance, heat intolerance, polydipsia and polyuria.   Genitourinary: Negative for difficulty urinating, hematuria and urgency.   Musculoskeletal: Negative for arthralgias, joint swelling and neck pain.   Neurological: Negative for dizziness, weakness and headaches.  "  Psychiatric/Behavioral: Negative for confusion and dysphoric mood.       Objective:     /80 (BP Location: Left arm, Patient Position: Sitting, BP Method: Large (Manual))   Pulse (!) 58   Resp 16   Ht 6' 1" (1.854 m)   Wt 91.6 kg (202 lb)   BMI 26.65 kg/m²     Physical Exam  Constitutional:       Appearance: Normal appearance. He is obese.   HENT:      Head: Normocephalic and atraumatic.      Right Ear: External ear normal.      Left Ear: External ear normal.      Nose: Nose normal.      Mouth/Throat:      Mouth: Mucous membranes are moist.      Pharynx: Oropharynx is clear.   Eyes:      Pupils: Pupils are equal, round, and reactive to light.   Cardiovascular:      Rate and Rhythm: Normal rate and regular rhythm.      Heart sounds: Normal heart sounds.   Pulmonary:      Effort: Pulmonary effort is normal.      Breath sounds: Normal breath sounds.   Abdominal:      Palpations: Abdomen is soft.   Musculoskeletal:         General: Normal range of motion.      Cervical back: Normal range of motion and neck supple.   Skin:     General: Skin is warm and dry.   Neurological:      General: No focal deficit present.      Mental Status: He is alert.   Psychiatric:         Mood and Affect: Mood normal.         Behavior: Behavior normal.         Thought Content: Thought content normal.         Judgment: Judgment normal.       Assessment:     1. Hypertension, unspecified type    2. Dyslipidemia    3. Elevated PSA    4. Encounter for prostate cancer screening    5. Cancer of prostate        Plan:     Problem List Items Addressed This Visit        Cardiac/Vascular    Hypertension - Primary    Dyslipidemia       Renal/    Elevated PSA    RESOLVED: Encounter for prostate cancer screening       Oncology    Cancer of prostate        No follow-ups on file.  6m fu plus lab    I am having Shad Olvera maintain his metoprolol succinate, losartan, aspirin, calcium carbonate/vitamin D3 (VITAMIN D-3 ORAL), vitamin D, tadalafiL, " gabapentin, pregabalin, tiZANidine, tramadol-acetaminophen 37.5-325 mg, travoprost, atorvastatin, sildenafil, atorvastatin, aspirin, metoprolol succinate, losartan, metoprolol tartrate, and lansoprazole.    Diagnoses and all orders for this visit:    Hypertension, unspecified type    Dyslipidemia    Elevated PSA    Encounter for prostate cancer screening    Cancer of prostate    Other orders  -     lansoprazole (PREVACID) 30 MG capsule; Take 1 capsule (30 mg total) by mouth once daily.      Medications Ordered This Encounter   Medications    lansoprazole (PREVACID) 30 MG capsule     Sig: Take 1 capsule (30 mg total) by mouth once daily.     Dispense:  90 capsule     Refill:  3     [unfilled]  No orders of the defined types were placed in this encounter.

## 2022-07-19 DIAGNOSIS — C61 CANCER OF PROSTATE: ICD-10-CM

## 2022-07-19 DIAGNOSIS — C61 PROSTATE CANCER: Primary | ICD-10-CM

## 2022-07-20 ENCOUNTER — OFFICE VISIT (OUTPATIENT)
Dept: UROLOGY | Facility: CLINIC | Age: 72
End: 2022-07-20
Payer: MEDICARE

## 2022-07-20 VITALS
SYSTOLIC BLOOD PRESSURE: 122 MMHG | OXYGEN SATURATION: 98 % | TEMPERATURE: 99 F | WEIGHT: 202 LBS | DIASTOLIC BLOOD PRESSURE: 80 MMHG | HEIGHT: 73 IN | BODY MASS INDEX: 26.77 KG/M2 | HEART RATE: 58 BPM

## 2022-07-20 DIAGNOSIS — C61 CANCER OF PROSTATE: Primary | ICD-10-CM

## 2022-07-20 DIAGNOSIS — R97.20 ELEVATED PSA: ICD-10-CM

## 2022-07-20 DIAGNOSIS — N52.31 ERECTILE DYSFUNCTION AFTER RADICAL PROSTATECTOMY: ICD-10-CM

## 2022-07-20 DIAGNOSIS — I10 HYPERTENSION, UNSPECIFIED TYPE: ICD-10-CM

## 2022-07-20 PROCEDURE — 1159F MED LIST DOCD IN RCRD: CPT | Mod: CPTII,,, | Performed by: NURSE PRACTITIONER

## 2022-07-20 PROCEDURE — 3008F PR BODY MASS INDEX (BMI) DOCUMENTED: ICD-10-PCS | Mod: CPTII,,, | Performed by: NURSE PRACTITIONER

## 2022-07-20 PROCEDURE — 1101F PT FALLS ASSESS-DOCD LE1/YR: CPT | Mod: CPTII,,, | Performed by: NURSE PRACTITIONER

## 2022-07-20 PROCEDURE — 3079F PR MOST RECENT DIASTOLIC BLOOD PRESSURE 80-89 MM HG: ICD-10-PCS | Mod: CPTII,,, | Performed by: NURSE PRACTITIONER

## 2022-07-20 PROCEDURE — 1126F PR PAIN SEVERITY QUANTIFIED, NO PAIN PRESENT: ICD-10-PCS | Mod: CPTII,,, | Performed by: NURSE PRACTITIONER

## 2022-07-20 PROCEDURE — 3074F SYST BP LT 130 MM HG: CPT | Mod: CPTII,,, | Performed by: NURSE PRACTITIONER

## 2022-07-20 PROCEDURE — 1101F PR PT FALLS ASSESS DOC 0-1 FALLS W/OUT INJ PAST YR: ICD-10-PCS | Mod: CPTII,,, | Performed by: NURSE PRACTITIONER

## 2022-07-20 PROCEDURE — 3079F DIAST BP 80-89 MM HG: CPT | Mod: CPTII,,, | Performed by: NURSE PRACTITIONER

## 2022-07-20 PROCEDURE — 1159F PR MEDICATION LIST DOCUMENTED IN MEDICAL RECORD: ICD-10-PCS | Mod: CPTII,,, | Performed by: NURSE PRACTITIONER

## 2022-07-20 PROCEDURE — 99215 OFFICE O/P EST HI 40 MIN: CPT | Mod: PBBFAC | Performed by: NURSE PRACTITIONER

## 2022-07-20 PROCEDURE — 3288F PR FALLS RISK ASSESSMENT DOCUMENTED: ICD-10-PCS | Mod: CPTII,,, | Performed by: NURSE PRACTITIONER

## 2022-07-20 PROCEDURE — 3008F BODY MASS INDEX DOCD: CPT | Mod: CPTII,,, | Performed by: NURSE PRACTITIONER

## 2022-07-20 PROCEDURE — 1160F PR REVIEW ALL MEDS BY PRESCRIBER/CLIN PHARMACIST DOCUMENTED: ICD-10-PCS | Mod: CPTII,,, | Performed by: NURSE PRACTITIONER

## 2022-07-20 PROCEDURE — 1126F AMNT PAIN NOTED NONE PRSNT: CPT | Mod: CPTII,,, | Performed by: NURSE PRACTITIONER

## 2022-07-20 PROCEDURE — 4010F PR ACE/ARB THEARPY RXD/TAKEN: ICD-10-PCS | Mod: CPTII,,, | Performed by: NURSE PRACTITIONER

## 2022-07-20 PROCEDURE — 3288F FALL RISK ASSESSMENT DOCD: CPT | Mod: CPTII,,, | Performed by: NURSE PRACTITIONER

## 2022-07-20 PROCEDURE — 99214 PR OFFICE/OUTPT VISIT, EST, LEVL IV, 30-39 MIN: ICD-10-PCS | Mod: S$PBB,,, | Performed by: NURSE PRACTITIONER

## 2022-07-20 PROCEDURE — 3074F PR MOST RECENT SYSTOLIC BLOOD PRESSURE < 130 MM HG: ICD-10-PCS | Mod: CPTII,,, | Performed by: NURSE PRACTITIONER

## 2022-07-20 PROCEDURE — 99214 OFFICE O/P EST MOD 30 MIN: CPT | Mod: S$PBB,,, | Performed by: NURSE PRACTITIONER

## 2022-07-20 PROCEDURE — 1160F RVW MEDS BY RX/DR IN RCRD: CPT | Mod: CPTII,,, | Performed by: NURSE PRACTITIONER

## 2022-07-20 PROCEDURE — 4010F ACE/ARB THERAPY RXD/TAKEN: CPT | Mod: CPTII,,, | Performed by: NURSE PRACTITIONER

## 2022-07-20 RX ORDER — VALSARTAN 320 MG/1
320 TABLET ORAL DAILY
COMMUNITY

## 2022-07-20 NOTE — ASSESSMENT & PLAN NOTE
· Lab Results   Component Value Date    PSA <0.010 07/19/2022    PSA <0.010 01/17/2022    PSA <0.010 11/29/2021   · RETURN TO CLINIC 6 MOS FOR RECHECK WITH PSA PRIOR TO VISIT, REMAINS IMMEASURABLE.

## 2022-07-20 NOTE — PROGRESS NOTES
Subjective:       Patient ID: Shad Olvera is a 72 y.o. male.    Chief Complaint: Other (6 month follow up visit cancer of prostate--brought medicines with him)    7/14/2021 - GUANAKITO:    psa is 0.     Underwent Cap 3+4 s/p RALP 2/18/20    Previous notes below    69 yoweek post op. no new issues. pathology was pT2Nx0, final 3+4 with 26%, negative margins. No leakage. No pads. Attempted cialis but no benefit. incision have healed well.      cialis 20 mg prn.    POC:    x sildenafil 100mg prn  Follow up 6 months with PSA prior    They will follow up in [place]  ---------------------------------------        4/5/2021:   HPI  Here before his scheduled follow up. Wants to discuss ED. Failed max viagra. Wife present at exam.     Plan:       68 yo 6 week post op. no new issues. pathology was pT2Nx0, final 3+4 with 26%, negative margins. going through 1 ppd. not sexually active. incision have healed well. psa pending today.  2/20.. psa has been undetectable. Did not get psa today    cialis 20 mg prn.     PSA in 3 months. Then q 6 months until 5 years out. He will reach out sooner if cialis doesn't work. Offered injection therapy. -----------------    PREVIOUS NOTE:    68 yo here with his wife for evaluation of robotic prostatectomy. Patient was initially planning on having surgery with Dr. Carrasco but his insurance does not cover travel expense. PSA was 5.5 the time of diagnosis his prostate was between 30 and 40 g. He has relatively normal erectile function with the mild IP assess score    assessment  Cap 3+4 s/p RALP 2/18/20    plan  -PSA is 6 months.   ____________________________________________________________________________________________________________________________  _________________________________________________________________________________________________________________________    Mr. Olvera is a pleasant 72 yo AA gentleman who is here today with his wife, for 6 month f/u appt for CAP, GS 3+4.  PSA 5.5 at  "diagnosis, RALP 2/18/2020.  PSA prior to visit remains undetectable.  Patient denies voiding complaints, and feels his urgency and nocturia is acceptable.  IPSS 7, PVR 0 ml.    Lab Results       Component                Value               Date                       PSA                      <0.010              01/17/2022                 PSA                      <0.010              11/29/2021                 PSA                      <0.010              07/06/2021     ------------(1/18/2021)-------------------------------------------------------------------    Ms. Olvera has returned to clinic today for 6 month f/u for CAP  3+4 s/p RALP 2/18/20.  PSA remains immeasurable since initial treatment.  Originally diagnosed November of 2019.  Denies weight loss, hematuria, incontinence.  No change in general health or recent hospitalizations.  He feels he is "feeling very well overall".  Patient states sildenafil and cialis were not effective for him.  We discussed alternative treatment.  He is going to discuss penile pump with his wife and let us know.        PSA HISTORY:              PSA    <0.010 ON 07/19/2022   PSA <0.010 01/17/2022   PSA <0.010 11/29/2021   PSA <0.010 07/06/2021  (7/19/2022)----------------------------------------------------------------                        Review of Systems   Constitutional: Negative.    Eyes: Negative for visual disturbance.   Respiratory: Negative.    Cardiovascular: Negative.    Gastrointestinal: Negative.    Endocrine: Negative.    Genitourinary: Negative for decreased urine volume, difficulty urinating, dysuria, enuresis, flank pain, frequency, genital sores, hematuria, penile discharge, penile pain, penile swelling, scrotal swelling, testicular pain and urgency.        Occassional urgency, sleeps all night when fluids controlled in the evening.   Musculoskeletal: Negative.    Allergic/Immunologic: Negative.    Neurological: Negative.    Hematological: Negative.  "   Psychiatric/Behavioral: Negative.        Objective:      Physical Exam  Vitals and nursing note reviewed.   Constitutional:       General: He is not in acute distress.     Appearance: Normal appearance. He is not ill-appearing, toxic-appearing or diaphoretic.   Eyes:      General: No scleral icterus.  Cardiovascular:      Rate and Rhythm: Normal rate and regular rhythm.      Pulses: Normal pulses.      Heart sounds: Normal heart sounds.   Pulmonary:      Effort: Pulmonary effort is normal.      Breath sounds: Normal breath sounds.   Abdominal:      General: Bowel sounds are normal. There is no distension.      Palpations: Abdomen is soft.      Tenderness: There is no abdominal tenderness. There is no right CVA tenderness, left CVA tenderness or guarding.   Musculoskeletal:      Right lower leg: No edema.      Left lower leg: No edema.   Skin:     General: Skin is warm and dry.      Capillary Refill: Capillary refill takes less than 2 seconds.      Coloration: Skin is not jaundiced or pale.      Findings: No bruising, erythema, lesion or rash.   Neurological:      Mental Status: He is alert and oriented to person, place, and time. Mental status is at baseline.   Psychiatric:         Mood and Affect: Mood normal.         Behavior: Behavior normal.         Thought Content: Thought content normal.         Judgment: Judgment normal.         Assessment:       1. Cancer of prostate    2. Hypertension, unspecified type    3. Elevated PSA    4. Erectile dysfunction after radical prostatectomy        Plan:       Cancer of prostate  · Lab Results   Component Value Date    PSA <0.010 07/19/2022    PSA <0.010 01/17/2022    PSA <0.010 11/29/2021   · RETURN TO CLINIC 6 MOS FOR RECHECK WITH PSA PRIOR TO VISIT, REMAINS IMMEASURABLE.    Hypertension  CONTROLLED    Erectile dysfunction after radical prostatectomy  · DISCONTINUE SILDENAFIL AND CIALIS  · PATIENT TO SPEAK WITH WIFE ABOUT PUMP, HE WILL LET US KNOW.  HE IS NOT INTERESTED  ABOUT PENILE INJECTIONS HOWEVER.

## 2022-07-20 NOTE — ASSESSMENT & PLAN NOTE
· DISCONTINUE SILDENAFIL AND CIALIS  · PATIENT TO SPEAK WITH WIFE ABOUT PUMP, HE WILL LET US KNOW.  HE IS NOT INTERESTED ABOUT PENILE INJECTIONS HOWEVER.

## 2022-11-09 DIAGNOSIS — Z71.89 COMPLEX CARE COORDINATION: ICD-10-CM

## 2022-11-15 RX ORDER — METOPROLOL SUCCINATE 50 MG/1
50 TABLET, EXTENDED RELEASE ORAL DAILY
Qty: 90 TABLET | Refills: 3 | Status: SHIPPED | OUTPATIENT
Start: 2022-11-15 | End: 2022-12-08 | Stop reason: SDUPTHER

## 2022-11-15 RX ORDER — LANSOPRAZOLE 30 MG/1
30 CAPSULE, DELAYED RELEASE ORAL DAILY
Qty: 90 CAPSULE | Refills: 3 | Status: SHIPPED | OUTPATIENT
Start: 2022-11-15 | End: 2024-02-05

## 2022-12-08 ENCOUNTER — OFFICE VISIT (OUTPATIENT)
Dept: PRIMARY CARE CLINIC | Facility: CLINIC | Age: 72
End: 2022-12-08
Payer: MEDICARE

## 2022-12-08 VITALS
DIASTOLIC BLOOD PRESSURE: 72 MMHG | BODY MASS INDEX: 25.84 KG/M2 | HEIGHT: 73 IN | RESPIRATION RATE: 18 BRPM | SYSTOLIC BLOOD PRESSURE: 120 MMHG | HEART RATE: 60 BPM | OXYGEN SATURATION: 98 % | WEIGHT: 195 LBS

## 2022-12-08 DIAGNOSIS — M15.3 OTHER SECONDARY OSTEOARTHRITIS OF MULTIPLE SITES: ICD-10-CM

## 2022-12-08 DIAGNOSIS — K21.00 GASTROESOPHAGEAL REFLUX DISEASE WITH ESOPHAGITIS, UNSPECIFIED WHETHER HEMORRHAGE: ICD-10-CM

## 2022-12-08 DIAGNOSIS — N52.31 ERECTILE DYSFUNCTION AFTER RADICAL PROSTATECTOMY: ICD-10-CM

## 2022-12-08 DIAGNOSIS — E78.5 DYSLIPIDEMIA: ICD-10-CM

## 2022-12-08 DIAGNOSIS — I10 HYPERTENSION, UNSPECIFIED TYPE: Primary | ICD-10-CM

## 2022-12-08 PROCEDURE — 1159F MED LIST DOCD IN RCRD: CPT | Mod: ,,, | Performed by: FAMILY MEDICINE

## 2022-12-08 PROCEDURE — 4010F ACE/ARB THERAPY RXD/TAKEN: CPT | Mod: ,,, | Performed by: FAMILY MEDICINE

## 2022-12-08 PROCEDURE — 4010F PR ACE/ARB THEARPY RXD/TAKEN: ICD-10-PCS | Mod: ,,, | Performed by: FAMILY MEDICINE

## 2022-12-08 PROCEDURE — 3074F PR MOST RECENT SYSTOLIC BLOOD PRESSURE < 130 MM HG: ICD-10-PCS | Mod: ,,, | Performed by: FAMILY MEDICINE

## 2022-12-08 PROCEDURE — 1159F PR MEDICATION LIST DOCUMENTED IN MEDICAL RECORD: ICD-10-PCS | Mod: ,,, | Performed by: FAMILY MEDICINE

## 2022-12-08 PROCEDURE — 3008F PR BODY MASS INDEX (BMI) DOCUMENTED: ICD-10-PCS | Mod: ,,, | Performed by: FAMILY MEDICINE

## 2022-12-08 PROCEDURE — 3008F BODY MASS INDEX DOCD: CPT | Mod: ,,, | Performed by: FAMILY MEDICINE

## 2022-12-08 PROCEDURE — 3078F DIAST BP <80 MM HG: CPT | Mod: ,,, | Performed by: FAMILY MEDICINE

## 2022-12-08 PROCEDURE — 99214 PR OFFICE/OUTPT VISIT, EST, LEVL IV, 30-39 MIN: ICD-10-PCS | Mod: ,,, | Performed by: FAMILY MEDICINE

## 2022-12-08 PROCEDURE — 1101F PT FALLS ASSESS-DOCD LE1/YR: CPT | Mod: ,,, | Performed by: FAMILY MEDICINE

## 2022-12-08 PROCEDURE — 3288F FALL RISK ASSESSMENT DOCD: CPT | Mod: ,,, | Performed by: FAMILY MEDICINE

## 2022-12-08 PROCEDURE — 1101F PR PT FALLS ASSESS DOC 0-1 FALLS W/OUT INJ PAST YR: ICD-10-PCS | Mod: ,,, | Performed by: FAMILY MEDICINE

## 2022-12-08 PROCEDURE — 3288F PR FALLS RISK ASSESSMENT DOCUMENTED: ICD-10-PCS | Mod: ,,, | Performed by: FAMILY MEDICINE

## 2022-12-08 PROCEDURE — 99214 OFFICE O/P EST MOD 30 MIN: CPT | Mod: ,,, | Performed by: FAMILY MEDICINE

## 2022-12-08 PROCEDURE — 3074F SYST BP LT 130 MM HG: CPT | Mod: ,,, | Performed by: FAMILY MEDICINE

## 2022-12-08 PROCEDURE — 3078F PR MOST RECENT DIASTOLIC BLOOD PRESSURE < 80 MM HG: ICD-10-PCS | Mod: ,,, | Performed by: FAMILY MEDICINE

## 2022-12-08 RX ORDER — METOPROLOL SUCCINATE 50 MG/1
50 TABLET, EXTENDED RELEASE ORAL DAILY
Qty: 90 TABLET | Refills: 3 | Status: SHIPPED | OUTPATIENT
Start: 2022-12-08

## 2022-12-08 NOTE — PROGRESS NOTES
Subjective:      Patient ID: Shad Olvera is a 72 y.o. male.    Chief Complaint: Follow-up (6mon. Ck-up)    Shad Olvera a 72 y.o. male presents for follow up on all regular problems which are reviewed and discussed.     Problem List Items Addressed This Visit          Cardiac/Vascular    Hypertension - Primary    Dyslipidemia       Renal/    Erectile dysfunction after radical prostatectomy    Overview     SILDENAFIL AND CIALIS WERE INEFFECTIVE            GI    Gastroesophageal reflux disease       Orthopedic    Osteoarthritis       Past Medical History:  Past Medical History:   Diagnosis Date    Cancer of prostate     Elevated PSA     Erectile dysfunction after radical prostatectomy 7/20/2022    Hypertension      Past Surgical History:   Procedure Laterality Date    PROSTATE SURGERY      TRIGGER FINGER RELEASE       Review of patient's allergies indicates:  No Known Allergies  Current Outpatient Medications on File Prior to Visit   Medication Sig Dispense Refill    aspirin 81 MG Chew 1 tablet      atorvastatin (LIPITOR) 20 MG tablet Take 1 tablet (20 mg total) by mouth every evening. 90 tablet 3    lansoprazole (PREVACID) 30 MG capsule Take 1 capsule (30 mg total) by mouth once daily. 90 capsule 3    travoprost (TRAVATAN Z) 0.004 % ophthalmic solution       valsartan (DIOVAN) 320 MG tablet Take 320 mg by mouth once daily.      vitamin D (VITAMIN D3) 1000 units Tab Take 2,000 Units by mouth once daily.      [DISCONTINUED] metoprolol succinate (TOPROL-XL) 50 MG 24 hr tablet Take 1 tablet (50 mg total) by mouth once daily. 90 tablet 3    [DISCONTINUED] aspirin (ECOTRIN) 81 MG EC tablet Take 81 mg by mouth once daily.      [DISCONTINUED] atorvastatin (LIPITOR) 20 MG tablet 1 tablet      [DISCONTINUED] calcium carbonate/vitamin D3 (VITAMIN D-3 ORAL) Take by mouth.      [DISCONTINUED] gabapentin (NEURONTIN) 300 MG capsule Take 1 capsule by mouth 3 (three) times daily.      [DISCONTINUED] losartan (COZAAR) 50 MG tablet        [DISCONTINUED] losartan (COZAAR) 50 MG tablet Take 1 tablet by mouth once daily.      [DISCONTINUED] metoprolol succinate (TOPROL-XL) 50 MG 24 hr tablet Take 1 tablet by mouth once daily.      [DISCONTINUED] metoprolol tartrate (LOPRESSOR) 50 MG tablet Take 1 tablet (50 mg total) by mouth once daily. (Patient not taking: Reported on 6/8/2022) 90 tablet 3    [DISCONTINUED] pregabalin (LYRICA) 50 MG capsule Take 1 capsule by mouth 2 (two) times a day.      [DISCONTINUED] tiZANidine (ZANAFLEX) 4 MG tablet Take 1 tablet by mouth every 8 (eight) hours as needed.      [DISCONTINUED] tramadol-acetaminophen 37.5-325 mg (ULTRACET) 37.5-325 mg Tab Take 1 tablet by mouth every 12 (twelve) hours as needed.       No current facility-administered medications on file prior to visit.     Social History     Socioeconomic History    Marital status:    Tobacco Use    Smoking status: Never    Smokeless tobacco: Never   Substance and Sexual Activity    Alcohol use: Never    Drug use: Never    Sexual activity: Not Currently     Family History   Problem Relation Age of Onset    Heart disease Father     Stroke Father     Heart disease Mother     Diabetes Mother        Review of Systems   Constitutional: Negative.  Negative for activity change and unexpected weight change.   HENT:  Negative for congestion, ear pain, hearing loss, nosebleeds, rhinorrhea and trouble swallowing.    Eyes:  Negative for pain, discharge, itching and visual disturbance.   Respiratory:  Negative for chest tightness and wheezing.    Cardiovascular:  Negative for chest pain and palpitations.   Gastrointestinal:  Negative for abdominal distention, blood in stool, constipation, diarrhea and vomiting.   Endocrine: Negative for cold intolerance, heat intolerance, polydipsia and polyuria.   Genitourinary:  Negative for difficulty urinating, hematuria and urgency.   Musculoskeletal:  Negative for arthralgias, joint swelling and neck pain.   Neurological:  Negative  "for dizziness, weakness and headaches.   Psychiatric/Behavioral:  Negative for confusion and dysphoric mood.      Objective:     /72 (BP Location: Left arm, Patient Position: Sitting, BP Method: Large (Manual))   Pulse 60   Resp 18   Ht 6' 1" (1.854 m)   Wt 88.5 kg (195 lb)   SpO2 98%   BMI 25.73 kg/m²     Physical Exam  Constitutional:       Appearance: Normal appearance. He is obese.   HENT:      Head: Normocephalic and atraumatic.      Right Ear: External ear normal.      Left Ear: External ear normal.      Nose: Nose normal.      Mouth/Throat:      Mouth: Mucous membranes are moist.      Pharynx: Oropharynx is clear.   Eyes:      Pupils: Pupils are equal, round, and reactive to light.   Cardiovascular:      Rate and Rhythm: Normal rate and regular rhythm.      Heart sounds: Normal heart sounds. No murmur heard.    No gallop.   Pulmonary:      Effort: Pulmonary effort is normal. No respiratory distress.      Breath sounds: Normal breath sounds. No wheezing or rales.   Abdominal:      Palpations: Abdomen is soft.   Musculoskeletal:         General: Normal range of motion.      Cervical back: Normal range of motion and neck supple.   Skin:     General: Skin is warm and dry.   Neurological:      General: No focal deficit present.      Mental Status: He is alert.   Psychiatric:         Mood and Affect: Mood normal.         Behavior: Behavior normal.         Thought Content: Thought content normal.         Judgment: Judgment normal.   Assessment:     1. Hypertension, unspecified type    2. Dyslipidemia    3. Erectile dysfunction after radical prostatectomy    4. Gastroesophageal reflux disease with esophagitis, unspecified whether hemorrhage    5. Other secondary osteoarthritis of multiple sites        Plan:     Problem List Items Addressed This Visit          Cardiac/Vascular    Hypertension - Primary    Dyslipidemia       Renal/    Erectile dysfunction after radical prostatectomy       GI    " Gastroesophageal reflux disease       Orthopedic    Osteoarthritis     No follow-ups on file.  6m fu  Lab orders placed    I am having Shad Olvera maintain his vitamin D, travoprost, atorvastatin, aspirin, valsartan, lansoprazole, and metoprolol succinate.    Shad was seen today for follow-up.    Diagnoses and all orders for this visit:    Hypertension, unspecified type    Dyslipidemia    Erectile dysfunction after radical prostatectomy    Gastroesophageal reflux disease with esophagitis, unspecified whether hemorrhage    Other secondary osteoarthritis of multiple sites    Other orders  -     metoprolol succinate (TOPROL-XL) 50 MG 24 hr tablet; Take 1 tablet (50 mg total) by mouth once daily.      Medications Ordered This Encounter   Medications    metoprolol succinate (TOPROL-XL) 50 MG 24 hr tablet     Sig: Take 1 tablet (50 mg total) by mouth once daily.     Dispense:  90 tablet     Refill:  3     .     [unfilled]  No orders of the defined types were placed in this encounter.

## 2023-01-18 ENCOUNTER — TELEPHONE (OUTPATIENT)
Dept: UROLOGY | Facility: CLINIC | Age: 73
End: 2023-01-18
Payer: MEDICARE

## 2023-01-18 DIAGNOSIS — R97.20 ELEVATED PSA: ICD-10-CM

## 2023-01-18 DIAGNOSIS — C61 CANCER OF PROSTATE: Primary | ICD-10-CM

## 2023-01-18 NOTE — TELEPHONE ENCOUNTER
----- Message from July Brady sent at 1/18/2023 10:28 AM CST -----  Shad Olvera calling regarding Patient Advice (message) for #patient wants to know do he need lab before his visit 1/20/23 @ 9:00. Please. Call his wife Monae @ [phone]  I called the wife back and spoke with her.  Informed her that he does need to have PSA drawn prior to visit and order is in the computer.  She voiced understanding.

## 2023-01-19 ENCOUNTER — TELEPHONE (OUTPATIENT)
Dept: PRIMARY CARE CLINIC | Facility: CLINIC | Age: 73
End: 2023-01-19
Payer: MEDICARE

## 2023-01-19 NOTE — TELEPHONE ENCOUNTER
----- Message from Matt Navas III, DO sent at 1/19/2023 12:04 PM CST -----  Looks good please let know

## 2023-01-19 NOTE — PROGRESS NOTES
Subjective:       Patient ID: Shad Olvera is a 72 y.o. male.    Chief Complaint: No chief complaint on file.    7/14/2021 - SAMARAWDEY:    psa is 0.     Underwent Cap 3+4 s/p RALP 2/18/20    Previous notes below    69 yoweek post op. no new issues. pathology was pT2Nx0, final 3+4 with 26%, negative margins. No leakage. No pads. Attempted cialis but no benefit. incision have healed well.      cialis 20 mg prn.    POC:    x sildenafil 100mg prn  Follow up 6 months with PSA prior    They will follow up in [place]  ---------------------------------------        4/5/2021:   HPI  Here before his scheduled follow up. Wants to discuss ED. Failed max viagra. Wife present at exam.     Plan:       68 yo 6 week post op. no new issues. pathology was pT2Nx0, final 3+4 with 26%, negative margins. going through 1 ppd. not sexually active. incision have healed well. psa pending today.  2/20.. psa has been undetectable. Did not get psa today    cialis 20 mg prn.     PSA in 3 months. Then q 6 months until 5 years out. He will reach out sooner if cialis doesn't work. Offered injection therapy. -----------------    PREVIOUS NOTE:    68 yo here with his wife for evaluation of robotic prostatectomy. Patient was initially planning on having surgery with Dr. Carrasco but his insurance does not cover travel expense. PSA was 5.5 the time of diagnosis his prostate was between 30 and 40 g. He has relatively normal erectile function with the mild IP assess score    assessment  Cap 3+4 s/p RALP 2/18/20    plan  -PSA is 6 months.   ____________________________________________________________________________________________________________________________  _________________________________________________________________________________________________________________________    Mr. Olvera is a pleasant 72 yo AA gentleman who is here today with his wife, for 6 month f/u appt for CAP, GS 3+4.  PSA 5.5 at diagnosis, [facility] 2/18/2020.  PSA prior to visit remains  "undetectable.  Patient denies voiding complaints, and feels his urgency and nocturia is acceptable.  IPSS 7, PVR 0 ml.    Lab Results       Component                Value               Date                       PSA                      <0.010              01/17/2022                 PSA                      <0.010              11/29/2021                 PSA                      <0.010              07/06/2021     ------------[1/18/2021]-------------------------------------------------------------------    Ms. Olvera has returned to clinic today for 6 month f/u for CAP  3+4 s/p RALP 2/18/20.  PSA remains immeasurable since initial treatment.  Originally diagnosed November of 2019.  Denies weight loss, hematuria, incontinence.  No change in general health or recent hospitalizations.  He feels he is "feeling very well overall".  Patient states sildenafil and cialis were not effective for him.  We discussed alternative treatment.  He is going to discuss penile pump with his wife and let us know.        PSA HISTORY:              PSA    <0.010 ON 07/19/2022   PSA <0.010 01/17/2022   PSA <0.010 11/29/2021   PSA <0.010 07/06/2021  [7/20/2022]----------------------------------------------------------------    Mr. Olvera is a 73 yo gentleman who is returning for 6 month f/u with PSA for CAP  3+4 s/p RALP 2/18/20.  PSA remains immeasurable since initial treatment.  He denies urological complaints, other than occasional urgency.  He is to have pacemaker inserted next week.    -  Cancer of prostate:  [facility]   Lab Results:              PSA     <0.010 on 1/19/2023   PSA <0.010 07/19/2022   PSA <0.010 01/17/2022   PSA <0.010 11/29/2021    -  Erectile dysfunction after radical prostatectomy             Declines treatment  [1/20/2023]-----------------------------------------------------------------------------------------                      Review of Systems   Constitutional: Negative.    Eyes:  Negative for visual disturbance. "   Respiratory: Negative.     Cardiovascular: Negative.    Gastrointestinal: Negative.    Endocrine: Negative.    Genitourinary:  Negative for decreased urine volume, difficulty urinating, dysuria, enuresis, flank pain, frequency, genital sores, hematuria, penile discharge, penile pain, penile swelling, scrotal swelling, testicular pain and urgency.        Occassional urgency, sleeps all night when fluids controlled in the evening.   Musculoskeletal: Negative.    Allergic/Immunologic: Negative.    Neurological: Negative.    Hematological: Negative.    Psychiatric/Behavioral: Negative.       Objective:      Physical Exam  Vitals and nursing note reviewed.   Constitutional:       General: He is not in acute distress.     Appearance: Normal appearance. He is not ill-appearing, toxic-appearing or diaphoretic.   Eyes:      General: No scleral icterus.  Cardiovascular:      Rate and Rhythm: Normal rate.   Pulmonary:      Effort: Pulmonary effort is normal.   Abdominal:      General: There is no distension.      Palpations: Abdomen is soft.      Tenderness: There is no abdominal tenderness. There is no right CVA tenderness, left CVA tenderness or guarding.   Musculoskeletal:      Right lower leg: No edema.      Left lower leg: No edema.   Skin:     General: Skin is warm and dry.      Coloration: Skin is not jaundiced or pale.      Findings: No bruising, erythema, lesion or rash.   Neurological:      Mental Status: He is alert and oriented to person, place, and time. Mental status is at baseline.   Psychiatric:         Mood and Affect: Mood normal.         Behavior: Behavior normal.         Thought Content: Thought content normal.         Judgment: Judgment normal.       Assessment:       1. Cancer of prostate    2. Erectile dysfunction after radical prostatectomy          Plan:       Cancer of prostate  PSA remains immeasurable 1/19/2023 (<0.010)  F/u 6 months with PSA  Patient requests referral with Dr. Bermudez                For continued care    Erectile dysfunction after radical prostatectomy  Declines further treatment at this time.

## 2023-01-20 ENCOUNTER — OFFICE VISIT (OUTPATIENT)
Dept: UROLOGY | Facility: CLINIC | Age: 73
End: 2023-01-20
Payer: MEDICARE

## 2023-01-20 VITALS
SYSTOLIC BLOOD PRESSURE: 132 MMHG | HEART RATE: 60 BPM | OXYGEN SATURATION: 96 % | HEIGHT: 73 IN | WEIGHT: 195 LBS | BODY MASS INDEX: 25.84 KG/M2 | TEMPERATURE: 98 F | DIASTOLIC BLOOD PRESSURE: 70 MMHG

## 2023-01-20 DIAGNOSIS — C61 CANCER OF PROSTATE: Primary | ICD-10-CM

## 2023-01-20 DIAGNOSIS — N52.31 ERECTILE DYSFUNCTION AFTER RADICAL PROSTATECTOMY: ICD-10-CM

## 2023-01-20 PROCEDURE — 3075F PR MOST RECENT SYSTOLIC BLOOD PRESS GE 130-139MM HG: ICD-10-PCS | Mod: CPTII,,, | Performed by: NURSE PRACTITIONER

## 2023-01-20 PROCEDURE — 3075F SYST BP GE 130 - 139MM HG: CPT | Mod: CPTII,,, | Performed by: NURSE PRACTITIONER

## 2023-01-20 PROCEDURE — 1160F PR REVIEW ALL MEDS BY PRESCRIBER/CLIN PHARMACIST DOCUMENTED: ICD-10-PCS | Mod: CPTII,,, | Performed by: NURSE PRACTITIONER

## 2023-01-20 PROCEDURE — 3008F PR BODY MASS INDEX (BMI) DOCUMENTED: ICD-10-PCS | Mod: CPTII,,, | Performed by: NURSE PRACTITIONER

## 2023-01-20 PROCEDURE — 99214 OFFICE O/P EST MOD 30 MIN: CPT | Mod: S$PBB,,, | Performed by: NURSE PRACTITIONER

## 2023-01-20 PROCEDURE — 99214 PR OFFICE/OUTPT VISIT, EST, LEVL IV, 30-39 MIN: ICD-10-PCS | Mod: S$PBB,,, | Performed by: NURSE PRACTITIONER

## 2023-01-20 PROCEDURE — 99215 OFFICE O/P EST HI 40 MIN: CPT | Mod: PBBFAC | Performed by: NURSE PRACTITIONER

## 2023-01-20 PROCEDURE — 4010F ACE/ARB THERAPY RXD/TAKEN: CPT | Mod: CPTII,,, | Performed by: NURSE PRACTITIONER

## 2023-01-20 PROCEDURE — 3078F PR MOST RECENT DIASTOLIC BLOOD PRESSURE < 80 MM HG: ICD-10-PCS | Mod: CPTII,,, | Performed by: NURSE PRACTITIONER

## 2023-01-20 PROCEDURE — 3078F DIAST BP <80 MM HG: CPT | Mod: CPTII,,, | Performed by: NURSE PRACTITIONER

## 2023-01-20 PROCEDURE — 3008F BODY MASS INDEX DOCD: CPT | Mod: CPTII,,, | Performed by: NURSE PRACTITIONER

## 2023-01-20 PROCEDURE — 1160F RVW MEDS BY RX/DR IN RCRD: CPT | Mod: CPTII,,, | Performed by: NURSE PRACTITIONER

## 2023-01-20 PROCEDURE — 1159F MED LIST DOCD IN RCRD: CPT | Mod: CPTII,,, | Performed by: NURSE PRACTITIONER

## 2023-01-20 PROCEDURE — 1159F PR MEDICATION LIST DOCUMENTED IN MEDICAL RECORD: ICD-10-PCS | Mod: CPTII,,, | Performed by: NURSE PRACTITIONER

## 2023-01-20 PROCEDURE — 4010F PR ACE/ARB THEARPY RXD/TAKEN: ICD-10-PCS | Mod: CPTII,,, | Performed by: NURSE PRACTITIONER

## 2023-01-31 ENCOUNTER — PATIENT OUTREACH (OUTPATIENT)
Dept: PRIMARY CARE CLINIC | Facility: CLINIC | Age: 73
End: 2023-01-31
Payer: MEDICARE

## 2023-06-08 ENCOUNTER — OFFICE VISIT (OUTPATIENT)
Dept: PRIMARY CARE CLINIC | Facility: CLINIC | Age: 73
End: 2023-06-08
Payer: MEDICARE

## 2023-06-08 VITALS
SYSTOLIC BLOOD PRESSURE: 120 MMHG | WEIGHT: 202.19 LBS | RESPIRATION RATE: 16 BRPM | BODY MASS INDEX: 26.8 KG/M2 | HEART RATE: 66 BPM | DIASTOLIC BLOOD PRESSURE: 70 MMHG | HEIGHT: 73 IN | OXYGEN SATURATION: 98 %

## 2023-06-08 DIAGNOSIS — C61 CANCER OF PROSTATE: Primary | ICD-10-CM

## 2023-06-08 DIAGNOSIS — K21.00 GASTROESOPHAGEAL REFLUX DISEASE WITH ESOPHAGITIS, UNSPECIFIED WHETHER HEMORRHAGE: ICD-10-CM

## 2023-06-08 DIAGNOSIS — E78.5 DYSLIPIDEMIA: ICD-10-CM

## 2023-06-08 DIAGNOSIS — N52.31 ERECTILE DYSFUNCTION AFTER RADICAL PROSTATECTOMY: ICD-10-CM

## 2023-06-08 DIAGNOSIS — I10 HYPERTENSION, UNSPECIFIED TYPE: Primary | ICD-10-CM

## 2023-06-08 DIAGNOSIS — E83.10 DISORDER OF IRON METABOLISM, UNSPECIFIED: ICD-10-CM

## 2023-06-08 DIAGNOSIS — Z12.5 ENCOUNTER FOR SCREENING FOR MALIGNANT NEOPLASM OF PROSTATE: ICD-10-CM

## 2023-06-08 PROCEDURE — 4010F ACE/ARB THERAPY RXD/TAKEN: CPT | Mod: ,,, | Performed by: FAMILY MEDICINE

## 2023-06-08 PROCEDURE — 3074F PR MOST RECENT SYSTOLIC BLOOD PRESSURE < 130 MM HG: ICD-10-PCS | Mod: ,,, | Performed by: FAMILY MEDICINE

## 2023-06-08 PROCEDURE — 3074F SYST BP LT 130 MM HG: CPT | Mod: ,,, | Performed by: FAMILY MEDICINE

## 2023-06-08 PROCEDURE — 3288F PR FALLS RISK ASSESSMENT DOCUMENTED: ICD-10-PCS | Mod: ,,, | Performed by: FAMILY MEDICINE

## 2023-06-08 PROCEDURE — 3078F PR MOST RECENT DIASTOLIC BLOOD PRESSURE < 80 MM HG: ICD-10-PCS | Mod: ,,, | Performed by: FAMILY MEDICINE

## 2023-06-08 PROCEDURE — 1101F PR PT FALLS ASSESS DOC 0-1 FALLS W/OUT INJ PAST YR: ICD-10-PCS | Mod: ,,, | Performed by: FAMILY MEDICINE

## 2023-06-08 PROCEDURE — 99214 PR OFFICE/OUTPT VISIT, EST, LEVL IV, 30-39 MIN: ICD-10-PCS | Mod: ,,, | Performed by: FAMILY MEDICINE

## 2023-06-08 PROCEDURE — 3008F PR BODY MASS INDEX (BMI) DOCUMENTED: ICD-10-PCS | Mod: ,,, | Performed by: FAMILY MEDICINE

## 2023-06-08 PROCEDURE — 99214 OFFICE O/P EST MOD 30 MIN: CPT | Mod: ,,, | Performed by: FAMILY MEDICINE

## 2023-06-08 PROCEDURE — 3008F BODY MASS INDEX DOCD: CPT | Mod: ,,, | Performed by: FAMILY MEDICINE

## 2023-06-08 PROCEDURE — 3078F DIAST BP <80 MM HG: CPT | Mod: ,,, | Performed by: FAMILY MEDICINE

## 2023-06-08 PROCEDURE — 1101F PT FALLS ASSESS-DOCD LE1/YR: CPT | Mod: ,,, | Performed by: FAMILY MEDICINE

## 2023-06-08 PROCEDURE — 3288F FALL RISK ASSESSMENT DOCD: CPT | Mod: ,,, | Performed by: FAMILY MEDICINE

## 2023-06-08 PROCEDURE — 4010F PR ACE/ARB THEARPY RXD/TAKEN: ICD-10-PCS | Mod: ,,, | Performed by: FAMILY MEDICINE

## 2023-06-08 RX ORDER — DILTIAZEM HYDROCHLORIDE 120 MG/1
120 CAPSULE, EXTENDED RELEASE ORAL DAILY
COMMUNITY
End: 2023-12-11

## 2023-06-08 NOTE — PROGRESS NOTES
Subjective:      Patient ID: Shad Olvera is a 73 y.o. male.    Chief Complaint: Follow-up (6 mo, got a pacemaker in Feb. Seeing Dr. Mae at [facility] and [facility] )    Shad Olvera a 73 y.o. male presents for follow up on all regular problems which are reviewed and discussed.   Tcc visit meds reconciled  Problem List Items Addressed This Visit          Cardiac/Vascular    Hypertension - Primary    Dyslipidemia       Renal/    Erectile dysfunction after radical prostatectomy    Overview     SILDENAFIL AND CIALIS WERE INEFFECTIVE            GI    Gastroesophageal reflux disease       Past Medical History:  Past Medical History:   Diagnosis Date    Cancer of prostate     Elevated PSA     Erectile dysfunction after radical prostatectomy 07/20/2022    Hypertension     Personal history of colonic polyps      Past Surgical History:   Procedure Laterality Date    PROSTATE SURGERY      TRIGGER FINGER RELEASE       Review of patient's allergies indicates:  No Known Allergies  Current Outpatient Medications on File Prior to Visit   Medication Sig Dispense Refill    aspirin 81 MG Chew 1 tablet      atorvastatin (LIPITOR) 20 MG tablet Take 1 tablet (20 mg total) by mouth every evening. 90 tablet 3    diltiaZEM (DILACOR XR) 120 MG CDCR Take 120 mg by mouth once daily.      lansoprazole (PREVACID) 30 MG capsule Take 1 capsule (30 mg total) by mouth once daily. 90 capsule 3    metoprolol succinate (TOPROL-XL) 50 MG 24 hr tablet Take 1 tablet (50 mg total) by mouth once daily. (Patient taking differently: Take 50 mg by mouth once daily. Take one tab in the am and a half in the pm) 90 tablet 3    travoprost (TRAVATAN Z) 0.004 % ophthalmic solution       valsartan (DIOVAN) 320 MG tablet Take 320 mg by mouth once daily.      vitamin D (VITAMIN D3) 1000 units Tab Take 2,000 Units by mouth once daily.       No current facility-administered medications on file prior to visit.     Social History     Socioeconomic History    Marital status:  "   Tobacco Use    Smoking status: Never    Smokeless tobacco: Never   Substance and Sexual Activity    Alcohol use: Never    Drug use: Never    Sexual activity: Not Currently     Family History   Problem Relation Age of Onset    Heart disease Father     Stroke Father     Heart disease Mother     Diabetes Mother        Review of Systems   Constitutional: Negative.    HENT:  Negative for congestion, ear pain, nosebleeds and trouble swallowing.    Eyes:  Negative for pain and itching.   Respiratory:  Negative for chest tightness.    Cardiovascular:  Negative for chest pain.   Gastrointestinal:  Negative for abdominal distention.   Endocrine: Negative for cold intolerance and heat intolerance.   Genitourinary:  Negative for difficulty urinating.   Musculoskeletal:  Negative for arthralgias.   Neurological:  Negative for dizziness.     Objective:     /70 (BP Location: Left arm, Patient Position: Sitting, BP Method: Large (Manual))   Pulse 66   Resp 16   Ht 6' 1" (1.854 m)   Wt 91.7 kg (202 lb 3.2 oz)   SpO2 98%   BMI 26.68 kg/m²     Physical Exam  Constitutional:       Appearance: Normal appearance. He is obese.   HENT:      Head: Normocephalic and atraumatic.      Right Ear: External ear normal.      Left Ear: External ear normal.      Nose: Nose normal.      Mouth/Throat:      Mouth: Mucous membranes are moist.      Pharynx: Oropharynx is clear.   Eyes:      Pupils: Pupils are equal, round, and reactive to light.   Neck:      Vascular: No carotid bruit.   Cardiovascular:      Rate and Rhythm: Normal rate and regular rhythm.      Heart sounds: Normal heart sounds. No murmur heard.    No gallop.   Pulmonary:      Effort: Pulmonary effort is normal. No respiratory distress.      Breath sounds: Normal breath sounds. No wheezing.   Abdominal:      Palpations: Abdomen is soft.   Musculoskeletal:         General: Normal range of motion.      Cervical back: Normal range of motion and neck supple.   Skin:     " General: Skin is warm and dry.   Neurological:      General: No focal deficit present.      Mental Status: He is alert.   Psychiatric:         Mood and Affect: Mood normal.         Behavior: Behavior normal.         Thought Content: Thought content normal.         Judgment: Judgment normal.   Assessment:     1. Hypertension, unspecified type    2. Dyslipidemia    3. Erectile dysfunction after radical prostatectomy    4. Gastroesophageal reflux disease with esophagitis, unspecified whether hemorrhage        Plan:     Problem List Items Addressed This Visit          Cardiac/Vascular    Hypertension - Primary    Dyslipidemia       Renal/    Erectile dysfunction after radical prostatectomy       GI    Gastroesophageal reflux disease     No follow-ups on file.  6m fu/lab    I am having Shad Olvera maintain his vitamin D, travoprost, atorvastatin, aspirin, valsartan, lansoprazole, metoprolol succinate, and diltiaZEM.    Shad was seen today for follow-up.    Diagnoses and all orders for this visit:    Hypertension, unspecified type    Dyslipidemia    Erectile dysfunction after radical prostatectomy    Gastroesophageal reflux disease with esophagitis, unspecified whether hemorrhage         [unfilled]  No orders of the defined types were placed in this encounter.

## 2023-06-09 DIAGNOSIS — Z71.89 COMPLEX CARE COORDINATION: ICD-10-CM

## 2023-06-14 NOTE — PROGRESS NOTES
Subjective     Patient ID: Shad Olvera is a 73 y.o. male.    Chief Complaint: No chief complaint on file.    7/14/2021 - GUA[place]O:     psa is 0.      Underwent Cap 3+4 s/p RALP 2/18/20     Previous notes below     69 yoweek post op. no new issues. pathology was pT2Nx0, final 3+4 with 26%, negative margins. No leakage. No pads. Attempted cialis but no benefit. incision have healed well.      cialis 20 mg prn.     POC:    x sildenafil 100mg prn  Follow up 6 months with PSA prior     They will follow up in [place]  ---------------------------------------         4/5/2021:   HPI  Here before his scheduled follow up. Wants to discuss ED. Failed max viagra. Wife present at exam.      Plan:       68 yo 6 week post op. no new issues. pathology was pT2Nx0, final 3+4 with 26%, negative margins. going through 1 ppd. not sexually active. incision have healed well. psa pending today.  2/20.. psa has been undetectable. Did not get psa today     cialis 20 mg prn.      PSA in 3 months. Then q 6 months until 5 years out. He will reach out sooner if cialis doesn't work. Offered injection therapy. -----------------     PREVIOUS NOTE:     68 yo here with his wife for evaluation of robotic prostatectomy. Patient was initially planning on having surgery with Dr. Carrasco but his insurance does not cover travel expense. PSA was 5.5 the time of diagnosis his prostate was between 30 and 40 g. He has relatively normal erectile function with the mild IP assess score     assessment  Cap 3+4 s/p RALP 2/18/20     plan  -PSA is 6 months.   ____________________________________________________________________________________________________________________________  _________________________________________________________________________________________________________________________     Mr. Olvera is a pleasant 72 yo AA gentleman who is here today with his wife, for 6 month f/u appt for CAP, GS 3+4.  [facility]  PSA prior to  "visit remains undetectable.  Patient denies voiding complaints, and feels his urgency and nocturia is acceptable.  IPSS 7, PVR 0 ml.    Lab Results       Component                Value               Date                       PSA                      <0.010              01/17/2022                 PSA                      <0.010              11/29/2021                 PSA                      <0.010              07/06/2021     ------------[1/18/2021]-------------------------------------------------------------------     Ms. Olvera has returned to clinic today for 6 month f/u for CAP  3+4 s/p RALP 2/18/20.  PSA remains immeasurable since initial treatment.  Originally diagnosed November of 2019.  Denies weight loss, hematuria, incontinence.  No change in general health or recent hospitalizations.  He feels he is "feeling very well overall".  Patient states sildenafil and cialis were not effective for him.  We discussed alternative treatment.  He is going to discuss penile pump with his wife and let us know.         PSA HISTORY:              PSA    <0.010 ON 07/19/2022              PSA     <0.010 01/17/2022              PSA     <0.010 11/29/2021              PSA     <0.010 07/06/2021  [7/20/2022]----------------------------------------------------------------     Mr. Olvera is a 73 yo gentleman who is returning for 6 month f/u with PSA for CAP  3+4 s/p RALP 2/18/20.  PSA remains immeasurable since initial treatment.  He denies urological complaints, other than occasional urgency.  He is to have pacemaker inserted next week.     -  Cancer of prostate:  [facility]              Lab Results:              PSA     <0.010 on 1/19/2023              PSA     <0.010 07/19/2022              PSA     <0.010 01/17/2022              PSA     <0.010 11/29/2021     -  Erectile dysfunction after radical prostatectomy             Declines " treatment  [1/20/2023]-----------------------------------------------------------------------------------------  -------------------------------------------------------------------------------------------------------------------------------------------------------------------------------------------------------------------------------------------------------    The above notes are from LAZARO Guido and Dr. SHERRI Melara in the EMR system     This pleasant 73 year old male presents to clinic for follow up of prostate cancer and ED. Patient states he is doing well and denies any new Urological complaints. He is pleased with the way he is voiding and desires to continue the current management. His PVR today is 0 and his IPPS score is 4 significant for nocturia 2 times a night and occasional urgency. He denies hematuria, dysuria or signs and symptoms of an infection. His PSA on June 16, 2023 was <0.010 and remains immeasurable. He states Dr. Melara did his RALP on February 18, 2020. Records indicate his pathology report showed  pT2Nx0, final 3+4 with 26% and negative margins. He does report ED since the prostate surgery and failed sildenafil and cialis. He has since had a pacemaker implant. I discussed the vacuum device vs  implant. He desires to discuss this with his wife and will let me know at the next visit what they decide. He is not currently on any Urological medications. I discussed the plan in detail with the patient and he is in agreement with the plan. All his questions were answered at today's visit.  I spent 20 minutes counseling this patient.                        PSA     <0.010 on 6/16/2023              PSA     <0.010 on 1/19/2023              PSA     <0.010 07/19/2022              PSA     <0.010 01/17/2022              PSA     <0.010 11/29/2021 -------------------------------------------------------------------------------------------------------------------[June 19, 2023].         Review of Systems    Constitutional:  Negative for activity change and fever.   HENT:  Negative for hearing loss and trouble swallowing.    Eyes:  Negative for visual disturbance.   Respiratory:  Negative for cough, shortness of breath and wheezing.    Cardiovascular:  Negative for chest pain.   Gastrointestinal:  Negative for abdominal pain, diarrhea, nausea and vomiting.   Endocrine: Negative for polyuria.   Genitourinary:  Positive for erectile dysfunction. Negative for bladder incontinence, decreased urine volume, difficulty urinating, discharge, dysuria, enuresis, flank pain, frequency, genital sores, hematuria, penile pain, penile swelling, scrotal swelling, testicular pain and urgency.        Prostate cancer    Musculoskeletal:  Negative for back pain and gait problem.   Integumentary:  Negative for rash.   Neurological:  Negative for speech difficulty and weakness.   Psychiatric/Behavioral:  Negative for behavioral problems and confusion.         Objective     Physical Exam  Vitals and nursing note reviewed.   Constitutional:       General: He is not in acute distress.     Appearance: Normal appearance. He is not ill-appearing, toxic-appearing or diaphoretic.   HENT:      Head: Normocephalic.   Eyes:      Extraocular Movements: Extraocular movements intact.   Cardiovascular:      Rate and Rhythm: Normal rate and regular rhythm.      Heart sounds: Normal heart sounds.   Pulmonary:      Effort: Pulmonary effort is normal. No respiratory distress.      Breath sounds: Normal breath sounds. No wheezing, rhonchi or rales.   Abdominal:      General: Bowel sounds are normal.      Palpations: Abdomen is soft.      Tenderness: There is no abdominal tenderness. There is no right CVA tenderness, left CVA tenderness, guarding or rebound.   Musculoskeletal:         General: Normal range of motion.      Cervical back: Normal range of motion. No rigidity.   Skin:     General: Skin is warm and dry.   Neurological:      General: No focal  deficit present.      Mental Status: He is alert and oriented to person, place, and time.      Motor: No weakness.      Coordination: Coordination normal.      Gait: Gait normal.   Psychiatric:         Mood and Affect: Mood normal.         Behavior: Behavior normal.         Thought Content: Thought content normal.        Assessment and Plan     Problem List Items Addressed This Visit          Renal/    Erectile dysfunction after radical prostatectomy       Oncology    Cancer of prostate - Primary    Relevant Orders    PSA, Total (Diagnostic)          PSA in 6 months   Discussed penile pump versus  implant   Follow up with Urology NP KHOI Alva in 6 months or sooner if needed

## 2023-06-19 ENCOUNTER — OFFICE VISIT (OUTPATIENT)
Dept: UROLOGY | Facility: CLINIC | Age: 73
End: 2023-06-19
Payer: MEDICARE

## 2023-06-19 VITALS
HEIGHT: 73 IN | SYSTOLIC BLOOD PRESSURE: 130 MMHG | BODY MASS INDEX: 26.77 KG/M2 | DIASTOLIC BLOOD PRESSURE: 88 MMHG | HEART RATE: 69 BPM | OXYGEN SATURATION: 97 % | TEMPERATURE: 99 F | WEIGHT: 202 LBS | RESPIRATION RATE: 18 BRPM

## 2023-06-19 DIAGNOSIS — C61 CANCER OF PROSTATE: Primary | ICD-10-CM

## 2023-06-19 DIAGNOSIS — N52.31 ERECTILE DYSFUNCTION AFTER RADICAL PROSTATECTOMY: ICD-10-CM

## 2023-06-19 PROCEDURE — 1101F PR PT FALLS ASSESS DOC 0-1 FALLS W/OUT INJ PAST YR: ICD-10-PCS | Mod: CPTII,,, | Performed by: NURSE PRACTITIONER

## 2023-06-19 PROCEDURE — 1159F PR MEDICATION LIST DOCUMENTED IN MEDICAL RECORD: ICD-10-PCS | Mod: CPTII,,, | Performed by: NURSE PRACTITIONER

## 2023-06-19 PROCEDURE — 3008F BODY MASS INDEX DOCD: CPT | Mod: CPTII,,, | Performed by: NURSE PRACTITIONER

## 2023-06-19 PROCEDURE — 3075F PR MOST RECENT SYSTOLIC BLOOD PRESS GE 130-139MM HG: ICD-10-PCS | Mod: CPTII,,, | Performed by: NURSE PRACTITIONER

## 2023-06-19 PROCEDURE — 1160F PR REVIEW ALL MEDS BY PRESCRIBER/CLIN PHARMACIST DOCUMENTED: ICD-10-PCS | Mod: CPTII,,, | Performed by: NURSE PRACTITIONER

## 2023-06-19 PROCEDURE — 1126F PR PAIN SEVERITY QUANTIFIED, NO PAIN PRESENT: ICD-10-PCS | Mod: CPTII,,, | Performed by: NURSE PRACTITIONER

## 2023-06-19 PROCEDURE — 3079F PR MOST RECENT DIASTOLIC BLOOD PRESSURE 80-89 MM HG: ICD-10-PCS | Mod: CPTII,,, | Performed by: NURSE PRACTITIONER

## 2023-06-19 PROCEDURE — 1159F MED LIST DOCD IN RCRD: CPT | Mod: CPTII,,, | Performed by: NURSE PRACTITIONER

## 2023-06-19 PROCEDURE — 1126F AMNT PAIN NOTED NONE PRSNT: CPT | Mod: CPTII,,, | Performed by: NURSE PRACTITIONER

## 2023-06-19 PROCEDURE — 99213 OFFICE O/P EST LOW 20 MIN: CPT | Mod: S$PBB,,, | Performed by: NURSE PRACTITIONER

## 2023-06-19 PROCEDURE — 3288F PR FALLS RISK ASSESSMENT DOCUMENTED: ICD-10-PCS | Mod: CPTII,,, | Performed by: NURSE PRACTITIONER

## 2023-06-19 PROCEDURE — 99213 PR OFFICE/OUTPT VISIT, EST, LEVL III, 20-29 MIN: ICD-10-PCS | Mod: S$PBB,,, | Performed by: NURSE PRACTITIONER

## 2023-06-19 PROCEDURE — 3079F DIAST BP 80-89 MM HG: CPT | Mod: CPTII,,, | Performed by: NURSE PRACTITIONER

## 2023-06-19 PROCEDURE — 4010F PR ACE/ARB THEARPY RXD/TAKEN: ICD-10-PCS | Mod: CPTII,,, | Performed by: NURSE PRACTITIONER

## 2023-06-19 PROCEDURE — 3288F FALL RISK ASSESSMENT DOCD: CPT | Mod: CPTII,,, | Performed by: NURSE PRACTITIONER

## 2023-06-19 PROCEDURE — 4010F ACE/ARB THERAPY RXD/TAKEN: CPT | Mod: CPTII,,, | Performed by: NURSE PRACTITIONER

## 2023-06-19 PROCEDURE — 99215 OFFICE O/P EST HI 40 MIN: CPT | Mod: PBBFAC | Performed by: NURSE PRACTITIONER

## 2023-06-19 PROCEDURE — 3008F PR BODY MASS INDEX (BMI) DOCUMENTED: ICD-10-PCS | Mod: CPTII,,, | Performed by: NURSE PRACTITIONER

## 2023-06-19 PROCEDURE — 3075F SYST BP GE 130 - 139MM HG: CPT | Mod: CPTII,,, | Performed by: NURSE PRACTITIONER

## 2023-06-19 PROCEDURE — 1160F RVW MEDS BY RX/DR IN RCRD: CPT | Mod: CPTII,,, | Performed by: NURSE PRACTITIONER

## 2023-06-19 PROCEDURE — 1101F PT FALLS ASSESS-DOCD LE1/YR: CPT | Mod: CPTII,,, | Performed by: NURSE PRACTITIONER

## 2023-06-19 RX ORDER — HYDROCODONE BITARTRATE AND ACETAMINOPHEN 7.5; 325 MG/1; MG/1
1 TABLET ORAL EVERY 8 HOURS PRN
COMMUNITY
Start: 2023-02-02 | End: 2023-12-11

## 2023-06-19 RX ORDER — TAMSULOSIN HYDROCHLORIDE 0.4 MG/1
CAPSULE ORAL
COMMUNITY
End: 2023-06-19

## 2023-06-19 RX ORDER — LOSARTAN POTASSIUM 50 MG/1
TABLET ORAL
COMMUNITY
End: 2023-12-11

## 2023-06-19 NOTE — PATIENT INSTRUCTIONS
PSA in 6 months   Discussed penile pump versus  implant   Follow up with Urology LAZARO Alva in 6 months or sooner if needed

## 2023-12-11 ENCOUNTER — OFFICE VISIT (OUTPATIENT)
Dept: PRIMARY CARE CLINIC | Facility: CLINIC | Age: 73
End: 2023-12-11
Payer: MEDICARE

## 2023-12-11 VITALS
OXYGEN SATURATION: 98 % | HEART RATE: 84 BPM | HEIGHT: 73 IN | RESPIRATION RATE: 18 BRPM | SYSTOLIC BLOOD PRESSURE: 126 MMHG | BODY MASS INDEX: 27.3 KG/M2 | WEIGHT: 206 LBS | DIASTOLIC BLOOD PRESSURE: 78 MMHG

## 2023-12-11 DIAGNOSIS — E78.5 DYSLIPIDEMIA: ICD-10-CM

## 2023-12-11 DIAGNOSIS — I10 HYPERTENSION, UNSPECIFIED TYPE: ICD-10-CM

## 2023-12-11 DIAGNOSIS — N18.31 CHRONIC KIDNEY DISEASE, STAGE 3A: Primary | ICD-10-CM

## 2023-12-11 DIAGNOSIS — E11.9 TYPE 2 DIABETES MELLITUS WITHOUT COMPLICATION, WITHOUT LONG-TERM CURRENT USE OF INSULIN: ICD-10-CM

## 2023-12-11 DIAGNOSIS — R97.20 ELEVATED PSA: ICD-10-CM

## 2023-12-11 DIAGNOSIS — K21.00 GASTROESOPHAGEAL REFLUX DISEASE WITH ESOPHAGITIS, UNSPECIFIED WHETHER HEMORRHAGE: ICD-10-CM

## 2023-12-11 PROCEDURE — 3044F HG A1C LEVEL LT 7.0%: CPT | Mod: ,,, | Performed by: FAMILY MEDICINE

## 2023-12-11 PROCEDURE — 99214 OFFICE O/P EST MOD 30 MIN: CPT | Mod: ,,, | Performed by: FAMILY MEDICINE

## 2023-12-11 PROCEDURE — 4010F PR ACE/ARB THEARPY RXD/TAKEN: ICD-10-PCS | Mod: ,,, | Performed by: FAMILY MEDICINE

## 2023-12-11 PROCEDURE — 3078F PR MOST RECENT DIASTOLIC BLOOD PRESSURE < 80 MM HG: ICD-10-PCS | Mod: ,,, | Performed by: FAMILY MEDICINE

## 2023-12-11 PROCEDURE — 3288F PR FALLS RISK ASSESSMENT DOCUMENTED: ICD-10-PCS | Mod: ,,, | Performed by: FAMILY MEDICINE

## 2023-12-11 PROCEDURE — 3008F PR BODY MASS INDEX (BMI) DOCUMENTED: ICD-10-PCS | Mod: ,,, | Performed by: FAMILY MEDICINE

## 2023-12-11 PROCEDURE — 3074F PR MOST RECENT SYSTOLIC BLOOD PRESSURE < 130 MM HG: ICD-10-PCS | Mod: ,,, | Performed by: FAMILY MEDICINE

## 2023-12-11 PROCEDURE — 3044F PR MOST RECENT HEMOGLOBIN A1C LEVEL <7.0%: ICD-10-PCS | Mod: ,,, | Performed by: FAMILY MEDICINE

## 2023-12-11 PROCEDURE — 99214 PR OFFICE/OUTPT VISIT, EST, LEVL IV, 30-39 MIN: ICD-10-PCS | Mod: ,,, | Performed by: FAMILY MEDICINE

## 2023-12-11 PROCEDURE — 3074F SYST BP LT 130 MM HG: CPT | Mod: ,,, | Performed by: FAMILY MEDICINE

## 2023-12-11 PROCEDURE — 1101F PR PT FALLS ASSESS DOC 0-1 FALLS W/OUT INJ PAST YR: ICD-10-PCS | Mod: ,,, | Performed by: FAMILY MEDICINE

## 2023-12-11 PROCEDURE — 1101F PT FALLS ASSESS-DOCD LE1/YR: CPT | Mod: ,,, | Performed by: FAMILY MEDICINE

## 2023-12-11 PROCEDURE — 3288F FALL RISK ASSESSMENT DOCD: CPT | Mod: ,,, | Performed by: FAMILY MEDICINE

## 2023-12-11 PROCEDURE — 3078F DIAST BP <80 MM HG: CPT | Mod: ,,, | Performed by: FAMILY MEDICINE

## 2023-12-11 PROCEDURE — 3008F BODY MASS INDEX DOCD: CPT | Mod: ,,, | Performed by: FAMILY MEDICINE

## 2023-12-11 PROCEDURE — 4010F ACE/ARB THERAPY RXD/TAKEN: CPT | Mod: ,,, | Performed by: FAMILY MEDICINE

## 2023-12-11 RX ORDER — DILTIAZEM HYDROCHLORIDE 120 MG/1
120 CAPSULE, COATED, EXTENDED RELEASE ORAL 2 TIMES DAILY
COMMUNITY
Start: 2023-11-13

## 2023-12-11 NOTE — PROGRESS NOTES
Subjective:      Patient ID: Shad Olvera is a 73 y.o. male.    Chief Complaint: Follow-up (6mon. Ck-up) and Hypertension    Shad Olvera a 73 y.o. male presents for follow up on all regular problems which are reviewed and discussed.   New dm  Problem List Items Addressed This Visit          Cardiac/Vascular    Hypertension    Dyslipidemia       Renal/    Elevated PSA    Chronic kidney disease, stage 3a - Primary       Endocrine    Type 2 diabetes mellitus without complication, without long-term current use of insulin       GI    Gastroesophageal reflux disease       Past Medical History:  Past Medical History:   Diagnosis Date    Cancer of prostate     Chronic kidney disease, stage 3a 12/11/2023    Elevated PSA     Erectile dysfunction after radical prostatectomy 07/20/2022    Hypertension     Personal history of colonic polyps     Type 2 diabetes mellitus without complication, without long-term current use of insulin 12/11/2023     Past Surgical History:   Procedure Laterality Date    PROSTATE SURGERY      TRIGGER FINGER RELEASE       Review of patient's allergies indicates:  No Known Allergies  Current Outpatient Medications on File Prior to Visit   Medication Sig Dispense Refill    aspirin 81 MG Chew 1 tablet      atorvastatin (LIPITOR) 20 MG tablet Take 1 tablet (20 mg total) by mouth every evening. 90 tablet 3    diltiaZEM (CARDIZEM CD) 120 MG Cp24 Take 120 mg by mouth 2 (two) times daily.      lansoprazole (PREVACID) 30 MG capsule Take 1 capsule (30 mg total) by mouth once daily. 90 capsule 3    magnesium chloride (SLOW-MAG ORAL) Take 64 mg by mouth once daily.      metoprolol succinate (TOPROL-XL) 50 MG 24 hr tablet Take 1 tablet (50 mg total) by mouth once daily. (Patient taking differently: Take 50 mg by mouth once daily. Take one tab in the am and a half in the pm) 90 tablet 3    travoprost (TRAVATAN Z) 0.004 % ophthalmic solution       valsartan (DIOVAN) 320 MG tablet Take 320 mg by mouth once daily.       vitamin D (VITAMIN D3) 1000 units Tab Take 2,000 Units by mouth once daily.      [DISCONTINUED] diltiaZEM (DILACOR XR) 120 MG CDCR Take 120 mg by mouth once daily.      [DISCONTINUED] HYDROcodone-acetaminophen (NORCO) 7.5-325 mg per tablet Take 1 tablet by mouth every 8 (eight) hours as needed.      [DISCONTINUED] losartan (COZAAR) 50 MG tablet TAKE 1 TABLET BY MOUTH EVERY DAY FOR BLOOD PRESSURE Oral for 30       No current facility-administered medications on file prior to visit.     Social History     Socioeconomic History    Marital status:    Tobacco Use    Smoking status: Never    Smokeless tobacco: Never   Substance and Sexual Activity    Alcohol use: Never    Drug use: Never    Sexual activity: Not Currently     Social Determinants of Health     Financial Resource Strain: Low Risk  (4/5/2021)    Overall Financial Resource Strain (CARDIA)     Difficulty of Paying Living Expenses: Not very hard   Transportation Needs: Unknown (4/5/2021)    PRAPARE - Transportation     Lack of Transportation (Medical): No   Physical Activity: Insufficiently Active (4/5/2021)    Exercise Vital Sign     Days of Exercise per Week: 3 days     Minutes of Exercise per Session: 20 min     Family History   Problem Relation Age of Onset    Heart disease Father     Stroke Father     Heart disease Mother     Diabetes Mother        Review of Systems   Constitutional: Negative.    HENT:  Negative for congestion, ear pain, nosebleeds and trouble swallowing.    Eyes:  Negative for pain and itching.   Respiratory:  Negative for chest tightness.    Cardiovascular:  Negative for chest pain.   Gastrointestinal:  Negative for abdominal distention.   Endocrine: Negative for cold intolerance and heat intolerance.   Genitourinary:  Negative for difficulty urinating.   Musculoskeletal:  Negative for arthralgias.   Neurological:  Negative for dizziness.       Objective:     /78 (BP Location: Left arm, Patient Position: Sitting, BP Method:  "Large (Manual))   Pulse 84   Resp 18   Ht 6' 1" (1.854 m)   Wt 93.4 kg (206 lb)   SpO2 98%   BMI 27.18 kg/m²     Physical Exam  Constitutional:       Appearance: Normal appearance. He is obese.   HENT:      Head: Normocephalic and atraumatic.      Right Ear: External ear normal.      Left Ear: External ear normal.      Nose: Nose normal.      Mouth/Throat:      Mouth: Mucous membranes are moist.      Pharynx: Oropharynx is clear.   Eyes:      Pupils: Pupils are equal, round, and reactive to light.   Cardiovascular:      Rate and Rhythm: Normal rate and regular rhythm.      Heart sounds: Normal heart sounds. No murmur heard.     No gallop.   Pulmonary:      Effort: Pulmonary effort is normal. No respiratory distress.      Breath sounds: Normal breath sounds. No wheezing or rales.   Abdominal:      Palpations: Abdomen is soft.   Musculoskeletal:         General: Normal range of motion.      Cervical back: Normal range of motion and neck supple.   Skin:     General: Skin is warm and dry.   Neurological:      General: No focal deficit present.      Mental Status: He is alert.   Psychiatric:         Mood and Affect: Mood normal.         Behavior: Behavior normal.         Thought Content: Thought content normal.         Judgment: Judgment normal.         1. Chronic kidney disease, stage 3a    2. Hypertension, unspecified type    3. Dyslipidemia    4. Elevated PSA    5. Gastroesophageal reflux disease with esophagitis, unspecified whether hemorrhage    6. Type 2 diabetes mellitus without complication, without long-term current use of insulin        Plan:     Problem List Items Addressed This Visit          Cardiac/Vascular    Hypertension    Dyslipidemia       Renal/    Elevated PSA    Chronic kidney disease, stage 3a - Primary       Endocrine    Type 2 diabetes mellitus without complication, without long-term current use of insulin       GI    Gastroesophageal reflux disease     No follow-ups on file.  6m fu " aic  Add jardiance    I am having Shad Olvera start on empagliflozin. I am also having him maintain his vitamin D, travoprost, atorvastatin, aspirin, valsartan, lansoprazole, metoprolol succinate, magnesium chloride (SLOW-MAG ORAL), and diltiaZEM.    Shad was seen today for follow-up and hypertension.    Diagnoses and all orders for this visit:    Chronic kidney disease, stage 3a    Hypertension, unspecified type    Dyslipidemia    Elevated PSA    Gastroesophageal reflux disease with esophagitis, unspecified whether hemorrhage    Type 2 diabetes mellitus without complication, without long-term current use of insulin    Other orders  -     empagliflozin (JARDIANCE) 25 mg tablet; Take 1 tablet (25 mg total) by mouth once daily.      Medications Ordered This Encounter   Medications    empagliflozin (JARDIANCE) 25 mg tablet     Sig: Take 1 tablet (25 mg total) by mouth once daily.     Dispense:  90 tablet     Refill:  4     [unfilled]  No orders of the defined types were placed in this encounter.

## 2023-12-18 NOTE — PROGRESS NOTES
Subjective     Patient ID: Shad Olvera is a 73 y.o. male.    Chief Complaint: No chief complaint on file.    7/14/2021 - GUANAKITO:     psa is 0.      Underwent Cap 3+4 s/p RALP 2/18/20     Previous notes below     69 yoweek post op. no new issues. pathology was pT2Nx0, final 3+4 with 26%, negative margins. No leakage. No pads. Attempted cialis but no benefit. incision have healed well.      cialis 20 mg prn.     POC:    x sildenafil 100mg prn  Follow up 6 months with PSA prior     They will follow up in [place]  ---------------------------------------         4/5/2021:   HPI  Here before his scheduled follow up. Wants to discuss ED. Failed max viagra. Wife present at exam.      Plan:       68 yo 6 week post op. no new issues. pathology was pT2Nx0, final 3+4 with 26%, negative margins. going through 1 ppd. not sexually active. incision have healed well. psa pending today.  2/20.. psa has been undetectable. Did not get psa today     cialis 20 mg prn.      PSA in 3 months. Then q 6 months until 5 years out. He will reach out sooner if cialis doesn't work. Offered injection therapy. -----------------     PREVIOUS NOTE:     68 yo here with his wife for evaluation of robotic prostatectomy. Patient was initially planning on having surgery with Dr. Carrasco but his insurance does not cover travel expense. PSA was 5.5 the time of diagnosis his prostate was between 30 and 40 g. He has relatively normal erectile function with the mild IP assess score     assessment  Cap 3+4 s/p RALP 2/18/20     plan  -PSA is 6 months.   ____________________________________________________________________________________________________________________________  _________________________________________________________________________________________________________________________     Mr. Olvera is a pleasant 70 yo AA gentleman who is here today with his wife, for 6 month f/u appt for CAP, GS 3+4.  [facility]  PSA prior to  "visit remains undetectable.  Patient denies voiding complaints, and feels his urgency and nocturia is acceptable.  IPSS 7, PVR 0 ml.    Lab Results       Component                Value               Date                       PSA                      <0.010              01/17/2022                 PSA                      <0.010              11/29/2021                 PSA                      <0.010              07/06/2021     ------------[1/18/2021]-------------------------------------------------------------------     Ms. Olvera has returned to clinic today for 6 month f/u for CAP  3+4 s/p RALP 2/18/20.  PSA remains immeasurable since initial treatment.  Originally diagnosed November of 2019.  Denies weight loss, hematuria, incontinence.  No change in general health or recent hospitalizations.  He feels he is "feeling very well overall".  Patient states sildenafil and cialis were not effective for him.  We discussed alternative treatment.  He is going to discuss penile pump with his wife and let us know.         PSA HISTORY:              PSA    <0.010 ON 07/19/2022              PSA     <0.010 01/17/2022              PSA     <0.010 11/29/2021              PSA     <0.010 07/06/2021  [7/20/2022]----------------------------------------------------------------     Mr. Olvera is a 73 yo gentleman who is returning for 6 month f/u with PSA for CAP  3+4 s/p RALP 2/18/20.  PSA remains immeasurable since initial treatment.  He denies urological complaints, other than occasional urgency.  He is to have pacemaker inserted next week.     -  Cancer of prostate:  B[facility]an              Lab Results:              PSA     <0.010 on 1/19/2023              PSA     <0.010 07/19/2022              PSA     <0.010 01/17/2022              PSA     <0.010 11/29/2021     -  Erectile dysfunction after radical prostatectomy             Declines " treatment  [1/20/2023]-----------------------------------------------------------------------------------------  -------------------------------------------------------------------------------------------------------------------------------------------------------------------------------------------------------------------------------------------------------    The above notes are from LAZARO Guido and Dr. SHERRI Melara in the EMR system      This pleasant 73 year old male presents to clinic for follow up of prostate cancer and ED. Patient states he is doing well and denies any new Urological complaints. He is pleased with the way he is voiding and desires to continue the current management. His PVR today is 0 and his IPPS score is 4 significant for nocturia 2 times a night and occasional urgency. He denies hematuria, dysuria or signs and symptoms of an infection. His PSA on June 16, 2023 was <0.010 and remains immeasurable. He states Dr. Melara did his RALP on February 18, 2020. Records indicate his pathology report showed  pT2Nx0, final 3+4 with 26% and negative margins. He does report ED since the prostate surgery and failed sildenafil and cialis. He has since had a pacemaker implant. I discussed the vacuum device vs  implant. He desires to discuss this with his wife and will let me know at the next visit what they decide. He is not currently on any Urological medications. I discussed the plan in detail with the patient and he is in agreement with the plan. All his questions were answered at today's visit.  I spent 20 minutes counseling this patient.                        PSA     <0.010 on 6/16/2023              PSA     <0.010 on 1/19/2023              PSA     <0.010 07/19/2022              PSA     <0.010 01/17/2022              PSA     <0.010 11/29/2021 -------------------------------------------------------------------------------------------------------------------[June 19, 2023].               This  pleasant 73 year old male presents to clinic for follow up of prostate cancer and ED. Patient states he is still doing well and denies any new Urological complaints. He is still pleased with the way he is voiding and desires to continue the current management. His PVR today is 0 and his IPPS score is 3 that reflects nocturia 2 times a night and occasional urgency. He denies hematuria, dysuria or signs and symptoms of an infection. His PSA on December 8, 2023 was <0.010 and remains immeasurable. He states Dr. Melara did his RALP on February 18, 2020. Records indicate his pathology report showed  pT2Nx0, final 3+4 with 26% and negative margins. He does report ED since the prostate surgery and failed sildenafil and cialis. He has since had a pacemaker implant. I discussed with the patient again trying the vacuum device vs  implant. He desires to wait and discuss this with his wife further.  He will let me know at the next visit what they decide. He is not currently on any Urological medications. We will repeat the PSA in 6 months. I discussed the plan in detail with the patient and he is in agreement with the plan. All his questions were answered at today's visit.  I spent 20 minutes counseling this patient.              PSA History:               PSA     <0.010 on 12/08/2023              PSA     <0.010 on 06/16/2023              PSA     <0.010 on 01/19/2023              PSA     <0.010 on 07/19/2022              PSA     <0.010 on 01/17/2022              PSA     <0.010 on 11/29/2021 -------------------------------------------------------------------------------------------------------------------[December 22, 2023]        Review of Systems   Constitutional:  Negative for activity change and fever.   HENT:  Negative for hearing loss and trouble swallowing.    Eyes:  Negative for visual disturbance.   Respiratory:  Negative for cough, shortness of breath and wheezing.    Cardiovascular:  Negative for chest pain.    Gastrointestinal:  Negative for abdominal pain, diarrhea, nausea and vomiting.   Endocrine: Negative for polyuria.   Genitourinary:  Positive for erectile dysfunction. Negative for bladder incontinence, decreased urine volume, difficulty urinating, discharge, dysuria, enuresis, flank pain, frequency, genital sores, hematuria, penile pain, penile swelling, scrotal swelling, testicular pain and urgency.        Prostate cancer    Musculoskeletal:  Negative for back pain and gait problem.   Integumentary:  Negative for rash.   Neurological:  Negative for speech difficulty and weakness.   Psychiatric/Behavioral:  Negative for behavioral problems and confusion.           Objective     Physical Exam  Vitals and nursing note reviewed.   Constitutional:       General: He is not in acute distress.     Appearance: Normal appearance. He is not ill-appearing, toxic-appearing or diaphoretic.   HENT:      Head: Normocephalic.   Eyes:      Extraocular Movements: Extraocular movements intact.   Cardiovascular:      Rate and Rhythm: Normal rate and regular rhythm.      Heart sounds: Normal heart sounds.   Pulmonary:      Effort: Pulmonary effort is normal. No respiratory distress.      Breath sounds: Normal breath sounds. No wheezing, rhonchi or rales.   Abdominal:      General: Bowel sounds are normal.      Palpations: Abdomen is soft.      Tenderness: There is no abdominal tenderness. There is no right CVA tenderness, left CVA tenderness, guarding or rebound.   Musculoskeletal:         General: Normal range of motion.      Cervical back: Normal range of motion. No rigidity.   Skin:     General: Skin is warm and dry.   Neurological:      General: No focal deficit present.      Mental Status: He is alert and oriented to person, place, and time.      Motor: No weakness.      Coordination: Coordination normal.      Gait: Gait normal.   Psychiatric:         Mood and Affect: Mood normal.         Behavior: Behavior normal.          Thought Content: Thought content normal.          Assessment and Plan     Problem List Items Addressed This Visit          Renal/    Erectile dysfunction after radical prostatectomy       Oncology    Cancer of prostate - Primary    Relevant Orders    PSA, Total (Diagnostic)        PSA in 6 months   Patient desires more time to consider the penile pump   Follow up with Urology in 6 months or sooner if needed

## 2023-12-22 ENCOUNTER — OFFICE VISIT (OUTPATIENT)
Dept: UROLOGY | Facility: CLINIC | Age: 73
End: 2023-12-22
Payer: MEDICARE

## 2023-12-22 VITALS
WEIGHT: 204 LBS | HEART RATE: 75 BPM | HEIGHT: 73 IN | SYSTOLIC BLOOD PRESSURE: 118 MMHG | DIASTOLIC BLOOD PRESSURE: 70 MMHG | TEMPERATURE: 99 F | BODY MASS INDEX: 27.04 KG/M2 | OXYGEN SATURATION: 96 % | RESPIRATION RATE: 18 BRPM

## 2023-12-22 DIAGNOSIS — N52.31 ERECTILE DYSFUNCTION AFTER RADICAL PROSTATECTOMY: ICD-10-CM

## 2023-12-22 DIAGNOSIS — C61 CANCER OF PROSTATE: Primary | ICD-10-CM

## 2023-12-22 PROCEDURE — 3074F PR MOST RECENT SYSTOLIC BLOOD PRESSURE < 130 MM HG: ICD-10-PCS | Mod: CPTII,,, | Performed by: NURSE PRACTITIONER

## 2023-12-22 PROCEDURE — 1159F MED LIST DOCD IN RCRD: CPT | Mod: CPTII,,, | Performed by: NURSE PRACTITIONER

## 2023-12-22 PROCEDURE — 99215 OFFICE O/P EST HI 40 MIN: CPT | Mod: PBBFAC | Performed by: NURSE PRACTITIONER

## 2023-12-22 PROCEDURE — 3078F DIAST BP <80 MM HG: CPT | Mod: CPTII,,, | Performed by: NURSE PRACTITIONER

## 2023-12-22 PROCEDURE — 1160F RVW MEDS BY RX/DR IN RCRD: CPT | Mod: CPTII,,, | Performed by: NURSE PRACTITIONER

## 2023-12-22 PROCEDURE — 99213 PR OFFICE/OUTPT VISIT, EST, LEVL III, 20-29 MIN: ICD-10-PCS | Mod: S$PBB,,, | Performed by: NURSE PRACTITIONER

## 2023-12-22 PROCEDURE — 1159F PR MEDICATION LIST DOCUMENTED IN MEDICAL RECORD: ICD-10-PCS | Mod: CPTII,,, | Performed by: NURSE PRACTITIONER

## 2023-12-22 PROCEDURE — 1101F PR PT FALLS ASSESS DOC 0-1 FALLS W/OUT INJ PAST YR: ICD-10-PCS | Mod: CPTII,,, | Performed by: NURSE PRACTITIONER

## 2023-12-22 PROCEDURE — 3288F PR FALLS RISK ASSESSMENT DOCUMENTED: ICD-10-PCS | Mod: CPTII,,, | Performed by: NURSE PRACTITIONER

## 2023-12-22 PROCEDURE — 1126F PR PAIN SEVERITY QUANTIFIED, NO PAIN PRESENT: ICD-10-PCS | Mod: CPTII,,, | Performed by: NURSE PRACTITIONER

## 2023-12-22 PROCEDURE — 1101F PT FALLS ASSESS-DOCD LE1/YR: CPT | Mod: CPTII,,, | Performed by: NURSE PRACTITIONER

## 2023-12-22 PROCEDURE — 4010F ACE/ARB THERAPY RXD/TAKEN: CPT | Mod: CPTII,,, | Performed by: NURSE PRACTITIONER

## 2023-12-22 PROCEDURE — 3008F PR BODY MASS INDEX (BMI) DOCUMENTED: ICD-10-PCS | Mod: CPTII,,, | Performed by: NURSE PRACTITIONER

## 2023-12-22 PROCEDURE — 3008F BODY MASS INDEX DOCD: CPT | Mod: CPTII,,, | Performed by: NURSE PRACTITIONER

## 2023-12-22 PROCEDURE — 1160F PR REVIEW ALL MEDS BY PRESCRIBER/CLIN PHARMACIST DOCUMENTED: ICD-10-PCS | Mod: CPTII,,, | Performed by: NURSE PRACTITIONER

## 2023-12-22 PROCEDURE — 3078F PR MOST RECENT DIASTOLIC BLOOD PRESSURE < 80 MM HG: ICD-10-PCS | Mod: CPTII,,, | Performed by: NURSE PRACTITIONER

## 2023-12-22 PROCEDURE — 3044F HG A1C LEVEL LT 7.0%: CPT | Mod: CPTII,,, | Performed by: NURSE PRACTITIONER

## 2023-12-22 PROCEDURE — 4010F PR ACE/ARB THEARPY RXD/TAKEN: ICD-10-PCS | Mod: CPTII,,, | Performed by: NURSE PRACTITIONER

## 2023-12-22 PROCEDURE — 99213 OFFICE O/P EST LOW 20 MIN: CPT | Mod: S$PBB,,, | Performed by: NURSE PRACTITIONER

## 2023-12-22 PROCEDURE — 3074F SYST BP LT 130 MM HG: CPT | Mod: CPTII,,, | Performed by: NURSE PRACTITIONER

## 2023-12-22 PROCEDURE — 3044F PR MOST RECENT HEMOGLOBIN A1C LEVEL <7.0%: ICD-10-PCS | Mod: CPTII,,, | Performed by: NURSE PRACTITIONER

## 2023-12-22 PROCEDURE — 1126F AMNT PAIN NOTED NONE PRSNT: CPT | Mod: CPTII,,, | Performed by: NURSE PRACTITIONER

## 2023-12-22 PROCEDURE — 3288F FALL RISK ASSESSMENT DOCD: CPT | Mod: CPTII,,, | Performed by: NURSE PRACTITIONER

## 2023-12-22 NOTE — PATIENT INSTRUCTIONS
PSA in 6 months   Patient desires more time to consider the penile pump   Follow up with Urology in 6 months or sooner if needed

## 2024-01-09 DIAGNOSIS — Z71.89 COMPLEX CARE COORDINATION: ICD-10-CM

## 2024-02-05 RX ORDER — LANSOPRAZOLE 30 MG/1
CAPSULE, DELAYED RELEASE ORAL
Qty: 90 CAPSULE | Refills: 3 | Status: SHIPPED | OUTPATIENT
Start: 2024-02-05

## 2024-06-11 ENCOUNTER — OFFICE VISIT (OUTPATIENT)
Dept: PRIMARY CARE CLINIC | Facility: CLINIC | Age: 74
End: 2024-06-11
Payer: MEDICARE

## 2024-06-11 VITALS
HEART RATE: 64 BPM | DIASTOLIC BLOOD PRESSURE: 70 MMHG | WEIGHT: 197.31 LBS | SYSTOLIC BLOOD PRESSURE: 108 MMHG | TEMPERATURE: 98 F | OXYGEN SATURATION: 95 % | BODY MASS INDEX: 26.15 KG/M2 | HEIGHT: 73 IN

## 2024-06-11 DIAGNOSIS — E11.9 TYPE 2 DIABETES MELLITUS WITHOUT COMPLICATION, WITHOUT LONG-TERM CURRENT USE OF INSULIN: Primary | ICD-10-CM

## 2024-06-11 DIAGNOSIS — Z12.5 ENCOUNTER FOR SCREENING FOR MALIGNANT NEOPLASM OF PROSTATE: ICD-10-CM

## 2024-06-11 DIAGNOSIS — E41 NUTRITIONAL MARASMUS: ICD-10-CM

## 2024-06-11 DIAGNOSIS — E83.30 DISORDER OF PHOSPHORUS METABOLISM, UNSPECIFIED: ICD-10-CM

## 2024-06-11 PROCEDURE — 99214 OFFICE O/P EST MOD 30 MIN: CPT | Mod: ,,, | Performed by: FAMILY MEDICINE

## 2024-06-11 PROCEDURE — 3008F BODY MASS INDEX DOCD: CPT | Mod: ,,, | Performed by: FAMILY MEDICINE

## 2024-06-11 PROCEDURE — 3044F HG A1C LEVEL LT 7.0%: CPT | Mod: ,,, | Performed by: FAMILY MEDICINE

## 2024-06-11 PROCEDURE — 4010F ACE/ARB THERAPY RXD/TAKEN: CPT | Mod: ,,, | Performed by: FAMILY MEDICINE

## 2024-06-11 PROCEDURE — 3074F SYST BP LT 130 MM HG: CPT | Mod: ,,, | Performed by: FAMILY MEDICINE

## 2024-06-11 PROCEDURE — 3288F FALL RISK ASSESSMENT DOCD: CPT | Mod: ,,, | Performed by: FAMILY MEDICINE

## 2024-06-11 PROCEDURE — 1126F AMNT PAIN NOTED NONE PRSNT: CPT | Mod: ,,, | Performed by: FAMILY MEDICINE

## 2024-06-11 PROCEDURE — 3078F DIAST BP <80 MM HG: CPT | Mod: ,,, | Performed by: FAMILY MEDICINE

## 2024-06-11 PROCEDURE — 1101F PT FALLS ASSESS-DOCD LE1/YR: CPT | Mod: ,,, | Performed by: FAMILY MEDICINE

## 2024-06-11 RX ORDER — MAGNESIUM CHLORIDE 71.5 G/G
64 TABLET ORAL DAILY
Qty: 90 TABLET | Refills: 12 | Status: SHIPPED | OUTPATIENT
Start: 2024-06-11

## 2024-06-11 NOTE — PROGRESS NOTES
Subjective:      Patient ID: Shad Olvera is a 74 y.o. male.    Chief Complaint: Follow-up (6mon. Ck-up) and Hypertension    Shad Olvera a 74 y.o. male presents for follow up on all regular problems which are reviewed and discussed.     Problem List Items Addressed This Visit          Endocrine    Type 2 diabetes mellitus without complication, without long-term current use of insulin - Primary    Relevant Orders    CBC Auto Differential    Comprehensive Metabolic Panel    Lipid Panel    PSA, Screening    TSH    Urinalysis    Iron and TIBC    Hemoglobin A1C    Vitamin B12 & Folate    Vitamin D     Other Visit Diagnoses       Encounter for screening for malignant neoplasm of prostate        Relevant Orders    PSA, Screening    Nutritional marasmus        Relevant Orders    Vitamin B12 & Folate    Disorder of phosphorus metabolism, unspecified        Relevant Orders    Vitamin D            Past Medical History:  Past Medical History:   Diagnosis Date    Cancer of prostate     Chronic kidney disease, stage 3a 12/11/2023    Elevated PSA     Erectile dysfunction after radical prostatectomy 07/20/2022    Hypertension     Personal history of colonic polyps     Type 2 diabetes mellitus without complication, without long-term current use of insulin 12/11/2023     Past Surgical History:   Procedure Laterality Date    PROSTATE SURGERY      TRIGGER FINGER RELEASE       Review of patient's allergies indicates:  No Known Allergies  Current Outpatient Medications on File Prior to Visit   Medication Sig Dispense Refill    aspirin 81 MG Chew 1 tablet      atorvastatin (LIPITOR) 20 MG tablet Take 1 tablet (20 mg total) by mouth every evening. 90 tablet 3    diltiaZEM (CARDIZEM CD) 120 MG Cp24 Take 120 mg by mouth 2 (two) times daily.      empagliflozin (JARDIANCE) 25 mg tablet Take 1 tablet (25 mg total) by mouth once daily. 90 tablet 4    lansoprazole (PREVACID) 30 MG capsule TAKE 1 CAPSULE (30 MG TOTAL) BY MOUTH ONCE DAILY FOR ACID  REFLUX 90 capsule 3    metoprolol succinate (TOPROL-XL) 50 MG 24 hr tablet Take 1 tablet (50 mg total) by mouth once daily. (Patient taking differently: Take 50 mg by mouth once daily. Take one tab in the am and a half in the pm) 90 tablet 3    travoprost (TRAVATAN Z) 0.004 % ophthalmic solution       valsartan (DIOVAN) 320 MG tablet Take 320 mg by mouth once daily.      vitamin D (VITAMIN D3) 1000 units Tab Take 2,000 Units by mouth once daily.      [DISCONTINUED] magnesium chloride (SLOW-MAG ORAL) Take 64 mg by mouth once daily.       No current facility-administered medications on file prior to visit.     Social History     Socioeconomic History    Marital status:    Tobacco Use    Smoking status: Never    Smokeless tobacco: Never   Substance and Sexual Activity    Alcohol use: Never    Drug use: Never    Sexual activity: Not Currently     Social Determinants of Health     Financial Resource Strain: Low Risk  (4/5/2021)    Overall Financial Resource Strain (CARDIA)     Difficulty of Paying Living Expenses: Not very hard   Food Insecurity: No Food Insecurity (6/8/2024)    Hunger Vital Sign     Worried About Running Out of Food in the Last Year: Never true     Ran Out of Food in the Last Year: Never true   Transportation Needs: Unknown (4/5/2021)    PRAPARE - Transportation     Lack of Transportation (Medical): No   Physical Activity: Insufficiently Active (6/8/2024)    Exercise Vital Sign     Days of Exercise per Week: 4 days     Minutes of Exercise per Session: 30 min   Stress: No Stress Concern Present (6/8/2024)    [employer] [place] [employer] - Occupational Stress Questionnaire     Feeling of Stress : Not at all   Housing Stability: Unknown (6/8/2024)    Housing Stability Vital Sign     Unable to Pay for Housing in the Last Year: No     Family History   Problem Relation Name Age of Onset    Heart disease Father roger     Stroke Father roger     Heart disease Mother jevon     Diabetes Mother  "jevon        Review of Systems   Constitutional: Negative.    HENT:  Negative for congestion, ear pain, nosebleeds and trouble swallowing.    Eyes:  Negative for pain and itching.   Respiratory:  Negative for chest tightness.    Cardiovascular:  Negative for chest pain.   Gastrointestinal:  Negative for abdominal distention.   Endocrine: Negative for cold intolerance and heat intolerance.   Genitourinary:  Negative for difficulty urinating.   Musculoskeletal:  Negative for arthralgias.   Neurological:  Negative for dizziness.     Objective:     /70 (BP Location: Left arm, Patient Position: Sitting)   Pulse 64   Temp 98.3 °F (36.8 °C)   Ht 6' 1" (1.854 m)   Wt 89.5 kg (197 lb 4.8 oz)   SpO2 95%   BMI 26.03 kg/m²     Physical Exam  Constitutional:       Appearance: Normal appearance. He is obese.   HENT:      Head: Normocephalic and atraumatic.      Right Ear: External ear normal.      Left Ear: External ear normal.      Nose: Nose normal.      Mouth/Throat:      Mouth: Mucous membranes are moist.      Pharynx: Oropharynx is clear.   Eyes:      Pupils: Pupils are equal, round, and reactive to light.   Neck:      Vascular: No carotid bruit.   Cardiovascular:      Rate and Rhythm: Normal rate and regular rhythm.      Heart sounds: Normal heart sounds. No murmur heard.     No gallop.   Pulmonary:      Effort: Pulmonary effort is normal. No respiratory distress.      Breath sounds: Normal breath sounds. No wheezing or rales.   Abdominal:      Palpations: Abdomen is soft.   Musculoskeletal:         General: Normal range of motion.      Cervical back: Normal range of motion and neck supple.   Skin:     General: Skin is warm and dry.   Neurological:      General: No focal deficit present.      Mental Status: He is alert.   Psychiatric:         Mood and Affect: Mood normal.         Behavior: Behavior normal.         Thought Content: Thought content normal.         Judgment: Judgment normal.   Assessment:     1. " Type 2 diabetes mellitus without complication, without long-term current use of insulin    2. Encounter for screening for malignant neoplasm of prostate    3. Nutritional marasmus    4. Disorder of phosphorus metabolism, unspecified        Plan:     Problem List Items Addressed This Visit          Endocrine    Type 2 diabetes mellitus without complication, without long-term current use of insulin - Primary    Relevant Orders    CBC Auto Differential    Comprehensive Metabolic Panel    Lipid Panel    PSA, Screening    TSH    Urinalysis    Iron and TIBC    Hemoglobin A1C    Vitamin B12 & Folate    Vitamin D     Other Visit Diagnoses       Encounter for screening for malignant neoplasm of prostate        Relevant Orders    PSA, Screening    Nutritional marasmus        Relevant Orders    Vitamin B12 & Folate    Disorder of phosphorus metabolism, unspecified        Relevant Orders    Vitamin D          No follow-ups on file.  Discussed A1c  6m fu lab    I have changed Shad Olvera's magnesium chloride (SLOW-MAG ORAL) to magnesium chloride (SLOW-MAG) 71.5 mg TbEC. I am also having him maintain his vitamin D, travoprost, atorvastatin, aspirin, valsartan, metoprolol succinate, diltiaZEM, empagliflozin, and lansoprazole.    Shad was seen today for follow-up (6mon. ck-up) and hypertension.    Diagnoses and all orders for this visit:    Type 2 diabetes mellitus without complication, without long-term current use of insulin  -     CBC Auto Differential; Future  -     Comprehensive Metabolic Panel; Standing  -     Lipid Panel; Standing  -     PSA, Screening; Future  -     TSH; Standing  -     Urinalysis; Standing  -     Iron and TIBC; Future  -     Hemoglobin A1C; Future  -     Vitamin B12 & Folate; Future  -     Vitamin D; Future    Encounter for screening for malignant neoplasm of prostate  -     PSA, Screening; Future    Nutritional marasmus  -     Vitamin B12 & Folate; Future    Disorder of phosphorus metabolism, unspecified  -      Vitamin D; Future    Other orders  -     magnesium chloride (SLOW-MAG) 71.5 mg TbEC; Take 64 mg by mouth once daily.      Medications Ordered This Encounter   Medications    magnesium chloride (SLOW-MAG) 71.5 mg TbEC     Sig: Take 64 mg by mouth once daily.     Dispense:  90 tablet     Refill:  12     [unfilled]  Orders Placed This Encounter   Procedures    CBC Auto Differential     Standing Status:   Future     Standing Expiration Date:   9/9/2025    Comprehensive Metabolic Panel     Standing Status:   Standing     Number of Occurrences:   4     Standing Expiration Date:   6/11/2025    Lipid Panel     Standing Status:   Standing     Number of Occurrences:   2     Standing Expiration Date:   8/10/2025    PSA, Screening     Standing Status:   Future     Standing Expiration Date:   9/9/2025    TSH     Standing Status:   Standing     Number of Occurrences:   2     Standing Expiration Date:   8/10/2025    Urinalysis     Standing Status:   Standing     Number of Occurrences:   1     Standing Expiration Date:   12/11/2025     Order Specific Question:   Collection Type     Answer:   Urine, Clean Catch    Iron and TIBC     Standing Status:   Future     Standing Expiration Date:   12/11/2025    Hemoglobin A1C     Standing Status:   Future     Standing Expiration Date:   9/9/2025    Vitamin B12 & Folate     Standing Status:   Future     Standing Expiration Date:   9/9/2025    Vitamin D     Standing Status:   Future     Standing Expiration Date:   9/9/2025

## 2024-06-21 NOTE — PROGRESS NOTES
Subjective     Patient ID: Shad Olvera is a 74 y.o. male.    Chief Complaint: No chief complaint on file.    7/14/2021 - GUANAKITO:     psa is 0.      Underwent Cap 3+4 s/p RALP 2/18/20     Previous notes below     69 yoweek post op. no new issues. pathology was pT2Nx0, final 3+4 with 26%, negative margins. No leakage. No pads. Attempted cialis but no benefit. incision have healed well.      cialis 20 mg prn.     POC:    x sildenafil 100mg prn  Follow up 6 months with PSA prior     They will follow up in [place]  ---------------------------------------         4/5/2021:   HPI  Here before his scheduled follow up. Wants to discuss ED. Failed max viagra. Wife present at exam.      Plan:       70 yo 6 week post op. no new issues. pathology was pT2Nx0, final 3+4 with 26%, negative margins. going through 1 ppd. not sexually active. incision have healed well. psa pending today.  2/20.. psa has been undetectable. Did not get psa today     cialis 20 mg prn.      PSA in 3 months. Then q 6 months until 5 years out. He will reach out sooner if cialis doesn't work. Offered injection therapy. -----------------     PREVIOUS NOTE:     70 yo here with his wife for evaluation of robotic prostatectomy. Patient was initially planning on having surgery with Dr. Carrasco but his insurance does not cover travel expense. PSA was 5.5 the time of diagnosis his prostate was between 30 and 40 g. He has relatively normal erectile function with the mild IP assess score     assessment  Cap 3+4 s/p RALP 2/18/20     plan  -PSA is 6 months.   ____________________________________________________________________________________________________________________________  _________________________________________________________________________________________________________________________     Mr. Olvera is a pleasant 72 yo AA gentleman who is here today with his wife, for 6 month f/u appt for CAP, GS 3+4.  [facility]  PSA prior to  "visit remains undetectable.  Patient denies voiding complaints, and feels his urgency and nocturia is acceptable.  IPSS 7, PVR 0 ml.    Lab Results       Component                Value               Date                       PSA                      <0.010              01/17/2022                 PSA                      <0.010              11/29/2021                 PSA                      <0.010              07/06/2021     ------------[1/18/2021]-------------------------------------------------------------------     Ms. Olvera has returned to clinic today for 6 month f/u for CAP  3+4 s/p RALP 2/18/20.  PSA remains immeasurable since initial treatment.  Originally diagnosed November of 2019.  Denies weight loss, hematuria, incontinence.  No change in general health or recent hospitalizations.  He feels he is "feeling very well overall".  Patient states sildenafil and cialis were not effective for him.  We discussed alternative treatment.  He is going to discuss penile pump with his wife and let us know.         PSA HISTORY:              PSA    <0.010 ON 07/19/2022              PSA     <0.010 01/17/2022              PSA     <0.010 11/29/2021              PSA     <0.010 07/06/2021  [7/20/2022]----------------------------------------------------------------     Mr. Olvera is a 71 yo gentleman who is returning for 6 month f/u with PSA for CAP  3+4 s/p RALP 2/18/20.  PSA remains immeasurable since initial treatment.  He denies urological complaints, other than occasional urgency.  He is to have pacemaker inserted next week.     -  Cancer of prostate:  Brayan              Lab Results:              PSA     <0.010 on 1/19/2023              PSA     <0.010 07/19/2022              PSA     <0.010 01/17/2022              PSA     <0.010 11/29/2021     -  Erectile dysfunction after radical prostatectomy             Declines " treatment  [1/20/2023]-----------------------------------------------------------------------------------------  -------------------------------------------------------------------------------------------------------------------------------------------------------------------------------------------------------------------------------------------------------    The above notes are from LAZARO Guido and Dr. SHERRI Melara in the EMR system      This pleasant 73 year old male presents to clinic for follow up of prostate cancer and ED. Patient states he is doing well and denies any new Urological complaints. He is pleased with the way he is voiding and desires to continue the current management. His PVR today is 0 and his IPPS score is 4 significant for nocturia 2 times a night and occasional urgency. He denies hematuria, dysuria or signs and symptoms of an infection. His PSA on June 16, 2023 was <0.010 and remains immeasurable. He states Dr. Melara did his RALP on February 18, 2020. Records indicate his pathology report showed  pT2Nx0, final 3+4 with 26% and negative margins. He does report ED since the prostate surgery and failed sildenafil and cialis. He has since had a pacemaker implant. I discussed the vacuum device vs  implant. He desires to discuss this with his wife and will let me know at the next visit what they decide. He is not currently on any Urological medications. I discussed the plan in detail with the patient and he is in agreement with the plan. All his questions were answered at today's visit.  I spent 20 minutes counseling this patient.                        PSA     <0.010 on 6/16/2023              PSA     <0.010 on 1/19/2023              PSA     <0.010 07/19/2022              PSA     <0.010 01/17/2022              PSA     <0.010 11/29/2021 -------------------------------------------------------------------------------------------------------------------[June 19, 2023].                  This  pleasant 73 year old male presents to clinic for follow up of prostate cancer and ED. Patient states he is still doing well and denies any new Urological complaints. He is still pleased with the way he is voiding and desires to continue the current management. His PVR today is 0 and his IPPS score is 3 that reflects nocturia 2 times a night and occasional urgency. He denies hematuria, dysuria or signs and symptoms of an infection. His PSA on December 8, 2023 was <0.010 and remains immeasurable. He states Dr. Melara did his RALP on February 18, 2020. Records indicate his pathology report showed  pT2Nx0, final 3+4 with 26% and negative margins. He does report ED since the prostate surgery and failed sildenafil and cialis. He has since had a pacemaker implant. I discussed with the patient again trying the vacuum device vs  implant. He desires to wait and discuss this with his wife further.  He will let me know at the next visit what they decide. He is not currently on any Urological medications. We will repeat the PSA in 6 months. I discussed the plan in detail with the patient and he is in agreement with the plan. All his questions were answered at today's visit.  I spent 20 minutes counseling this patient.            PSA History:               PSA     <0.010 on 12/08/2023              PSA     <0.010 on 06/16/2023              PSA     <0.010 on 01/19/2023              PSA     <0.010 on 07/19/2022              PSA     <0.010 on 01/17/2022              PSA     <0.010 on 11/29/2021 -------------------------------------------------------------------------------------------------------------------[December 22, 2023]               This pleasant 74 year old male presents to clinic for follow up of prostate cancer and ED. Patient states he is still doing well and denies any new Urological complaints. He did not get his PSA before today's visit and will get this on the way out.  We will call him the results and  recommendations.  He states he is delighted with the way he is voiding and desires to continue the current management. His PVR today is 0 and his IPPS score is 2 that reflects nocturia 1 times a night and occasional urgency. He denies hematuria, dysuria or signs and symptoms of an infection. His PSA on December 8, 2023 was <0.010 and was immeasurable. He states Dr. Melara did his RALP on February 18, 2020. Records indicate his pathology report showed  pT2Nx0, final 3+4 with 26% and negative margins. He does report ED since the prostate surgery and failed sildenafil and cialis. He has since had a pacemaker implant. I discussed with the patient again trying the vacuum device vs  implant. He desires to wait and discuss this with his wife further.  He will let me know when they are ready to further discuss this. He is not currently on any Urological medications. I will culture his urine and treat if indicated.  I discussed the plan in detail with the patient and he is in agreement with the plan. All his questions were answered at today's visit.  I spent 20 minutes counseling this patient.            PSA History:               PSA     <0.010 on 12/08/2023              PSA     <0.010 on 06/16/2023              PSA     <0.010 on 01/19/2023              PSA     <0.010 on 07/19/2022              PSA     <0.010 on 01/17/2022              PSA     <0.010 on 11/29/2021  ------------------------------------------------------------------------------------------------------------------  [June 24, 2024].           Review of Systems   Constitutional:  Negative for activity change and fever.   HENT:  Negative for hearing loss and trouble swallowing.    Eyes:  Negative for visual disturbance.   Respiratory:  Negative for cough, shortness of breath and wheezing.    Cardiovascular:  Negative for chest pain.   Gastrointestinal:  Negative for abdominal pain, diarrhea, nausea and vomiting.   Endocrine: Negative for polyuria.    Genitourinary:  Positive for erectile dysfunction and urgency. Negative for bladder incontinence, decreased urine volume, difficulty urinating, discharge, dysuria, enuresis, flank pain, frequency, genital sores, hematuria, penile pain, penile swelling, scrotal swelling and testicular pain.        Prostate Cancer         Nocturia    Musculoskeletal:  Negative for back pain and gait problem.   Integumentary:  Negative for rash.   Neurological:  Negative for speech difficulty and weakness.   Psychiatric/Behavioral:  Negative for behavioral problems and confusion.           Objective     Physical Exam  Vitals and nursing note reviewed.   Constitutional:       General: He is not in acute distress.     Appearance: Normal appearance. He is not ill-appearing, toxic-appearing or diaphoretic.   HENT:      Head: Normocephalic.   Eyes:      Extraocular Movements: Extraocular movements intact.   Cardiovascular:      Rate and Rhythm: Normal rate and regular rhythm.      Heart sounds: Normal heart sounds.   Pulmonary:      Effort: Pulmonary effort is normal. No respiratory distress.      Breath sounds: Normal breath sounds. No wheezing, rhonchi or rales.   Abdominal:      General: Bowel sounds are normal.      Palpations: Abdomen is soft.      Tenderness: There is no abdominal tenderness. There is no right CVA tenderness, left CVA tenderness, guarding or rebound.   Musculoskeletal:         General: Normal range of motion.      Cervical back: Normal range of motion. No rigidity.   Skin:     General: Skin is warm and dry.   Neurological:      General: No focal deficit present.      Mental Status: He is alert and oriented to person, place, and time.      Motor: No weakness.      Coordination: Coordination normal.      Gait: Gait normal.   Psychiatric:         Mood and Affect: Mood normal.         Behavior: Behavior normal.         Thought Content: Thought content normal.          Assessment and Plan     Problem List Items Addressed  This Visit          Renal/    Erectile dysfunction after radical prostatectomy       Oncology    Cancer of prostate - Primary     Other Visit Diagnoses       Nocturia        Relevant Orders    Urine culture    Urgency of urination        Relevant Orders    Urine culture                 PSA today   Urine Culture   PSA in 6 months   Patient desires more time to consider the penile pump   Follow up with Urology in 6 months or sooner if needed

## 2024-06-24 ENCOUNTER — TELEPHONE (OUTPATIENT)
Dept: UROLOGY | Facility: CLINIC | Age: 74
End: 2024-06-24
Payer: MEDICARE

## 2024-06-24 ENCOUNTER — OFFICE VISIT (OUTPATIENT)
Dept: UROLOGY | Facility: CLINIC | Age: 74
End: 2024-06-24
Payer: MEDICARE

## 2024-06-24 VITALS
OXYGEN SATURATION: 96 % | TEMPERATURE: 98 F | WEIGHT: 195 LBS | HEIGHT: 73 IN | SYSTOLIC BLOOD PRESSURE: 133 MMHG | HEART RATE: 72 BPM | DIASTOLIC BLOOD PRESSURE: 77 MMHG | BODY MASS INDEX: 25.84 KG/M2

## 2024-06-24 DIAGNOSIS — N52.31 ERECTILE DYSFUNCTION AFTER RADICAL PROSTATECTOMY: ICD-10-CM

## 2024-06-24 DIAGNOSIS — R35.1 NOCTURIA: ICD-10-CM

## 2024-06-24 DIAGNOSIS — R39.15 URGENCY OF URINATION: ICD-10-CM

## 2024-06-24 DIAGNOSIS — C61 CANCER OF PROSTATE: Primary | ICD-10-CM

## 2024-06-24 PROBLEM — I49.5 SICK SINUS SYNDROME: Status: ACTIVE | Noted: 2024-06-24

## 2024-06-24 PROBLEM — D68.69 OTHER THROMBOPHILIA: Status: ACTIVE | Noted: 2024-06-24

## 2024-06-24 PROBLEM — I48.91 UNSPECIFIED ATRIAL FIBRILLATION: Status: ACTIVE | Noted: 2024-06-24

## 2024-06-24 PROCEDURE — 99999 PR PBB SHADOW E&M-EST. PATIENT-LVL IV: CPT | Mod: PBBFAC,,, | Performed by: NURSE PRACTITIONER

## 2024-06-24 PROCEDURE — 3075F SYST BP GE 130 - 139MM HG: CPT | Mod: CPTII,,, | Performed by: NURSE PRACTITIONER

## 2024-06-24 PROCEDURE — 1160F RVW MEDS BY RX/DR IN RCRD: CPT | Mod: CPTII,,, | Performed by: NURSE PRACTITIONER

## 2024-06-24 PROCEDURE — 87077 CULTURE AEROBIC IDENTIFY: CPT | Mod: ,,, | Performed by: CLINICAL MEDICAL LABORATORY

## 2024-06-24 PROCEDURE — 1159F MED LIST DOCD IN RCRD: CPT | Mod: CPTII,,, | Performed by: NURSE PRACTITIONER

## 2024-06-24 PROCEDURE — 87086 URINE CULTURE/COLONY COUNT: CPT | Mod: ,,, | Performed by: CLINICAL MEDICAL LABORATORY

## 2024-06-24 PROCEDURE — 3288F FALL RISK ASSESSMENT DOCD: CPT | Mod: CPTII,,, | Performed by: NURSE PRACTITIONER

## 2024-06-24 PROCEDURE — 3078F DIAST BP <80 MM HG: CPT | Mod: CPTII,,, | Performed by: NURSE PRACTITIONER

## 2024-06-24 PROCEDURE — 3044F HG A1C LEVEL LT 7.0%: CPT | Mod: CPTII,,, | Performed by: NURSE PRACTITIONER

## 2024-06-24 PROCEDURE — 1101F PT FALLS ASSESS-DOCD LE1/YR: CPT | Mod: CPTII,,, | Performed by: NURSE PRACTITIONER

## 2024-06-24 PROCEDURE — 99214 OFFICE O/P EST MOD 30 MIN: CPT | Mod: PBBFAC | Performed by: NURSE PRACTITIONER

## 2024-06-24 PROCEDURE — 3008F BODY MASS INDEX DOCD: CPT | Mod: CPTII,,, | Performed by: NURSE PRACTITIONER

## 2024-06-24 PROCEDURE — 87186 SC STD MICRODIL/AGAR DIL: CPT | Mod: ,,, | Performed by: CLINICAL MEDICAL LABORATORY

## 2024-06-24 PROCEDURE — 4010F ACE/ARB THERAPY RXD/TAKEN: CPT | Mod: CPTII,,, | Performed by: NURSE PRACTITIONER

## 2024-06-24 PROCEDURE — 99213 OFFICE O/P EST LOW 20 MIN: CPT | Mod: S$PBB,,, | Performed by: NURSE PRACTITIONER

## 2024-06-24 NOTE — PATIENT INSTRUCTIONS
PSA today   Urine Culture   PSA in 6 months   Patient desires more time to consider the penile pump   Follow up with Urology in 6 months or sooner if needed

## 2024-06-24 NOTE — TELEPHONE ENCOUNTER
----- Message from Ramos Benitez NP sent at 6/24/2024  1:31 PM CDT -----  Please let patient know his PSA was immeasurable at <0.010 and this is good, thanks   I called pt and spoke with the wife.  Informed her of the above message.  She voiced understanding.

## 2024-06-27 ENCOUNTER — TELEPHONE (OUTPATIENT)
Dept: UROLOGY | Facility: CLINIC | Age: 74
End: 2024-06-27
Payer: MEDICARE

## 2024-06-27 DIAGNOSIS — N39.0 URINARY TRACT INFECTION WITHOUT HEMATURIA, SITE UNSPECIFIED: Primary | ICD-10-CM

## 2024-06-27 LAB — UA COMPLETE W REFLEX CULTURE PNL UR: ABNORMAL

## 2024-06-27 RX ORDER — SULFAMETHOXAZOLE AND TRIMETHOPRIM 800; 160 MG/1; MG/1
TABLET ORAL
Qty: 20 TABLET | Refills: 0 | Status: SHIPPED | OUTPATIENT
Start: 2024-06-27

## 2024-06-27 NOTE — TELEPHONE ENCOUNTER
----- Message from Ramos Benitez NP sent at 6/27/2024  8:17 AM CDT -----  Please let patient know his urine culture grew 2,000 Escherichia coli And if no allergy to sulfa then we can treat with bactrim ds one bid x 10 days, please let me know what pharmacy, thanks   I called pt wife and spoke with her and relayed the above message.  She said he is not allergic to Bactrim or sulfa.  Send Rx to [facility] in [place], [place]  Will relay this info to NP.

## 2024-06-27 NOTE — PROGRESS NOTES
Please let patient know his urine culture grew 2,000 Escherichia coli And if no allergy to sulfa then we can treat with bactrim ds one bid x 10 days, please let me know what pharmacy, thanks     Cynthia Marinelli, Ramos Cadena, NP  Wife said he is not allergic to sulfa or bactrim--send Rx to [facility] in [place], [place]

## 2024-08-07 ENCOUNTER — OFFICE VISIT (OUTPATIENT)
Dept: PRIMARY CARE CLINIC | Facility: CLINIC | Age: 74
End: 2024-08-07
Payer: MEDICARE

## 2024-08-07 VITALS
HEART RATE: 63 BPM | SYSTOLIC BLOOD PRESSURE: 102 MMHG | OXYGEN SATURATION: 98 % | DIASTOLIC BLOOD PRESSURE: 70 MMHG | RESPIRATION RATE: 18 BRPM | WEIGHT: 192 LBS | HEIGHT: 73 IN | BODY MASS INDEX: 25.45 KG/M2

## 2024-08-07 DIAGNOSIS — I48.91 ATRIAL FIBRILLATION, UNSPECIFIED TYPE: ICD-10-CM

## 2024-08-07 DIAGNOSIS — D68.69 OTHER THROMBOPHILIA: ICD-10-CM

## 2024-08-07 DIAGNOSIS — N18.31 CHRONIC KIDNEY DISEASE, STAGE 3A: ICD-10-CM

## 2024-08-07 DIAGNOSIS — U07.1 COVID: Primary | ICD-10-CM

## 2024-08-07 PROCEDURE — 3288F FALL RISK ASSESSMENT DOCD: CPT | Mod: ,,, | Performed by: FAMILY MEDICINE

## 2024-08-07 PROCEDURE — 1101F PT FALLS ASSESS-DOCD LE1/YR: CPT | Mod: ,,, | Performed by: FAMILY MEDICINE

## 2024-08-07 PROCEDURE — 96372 THER/PROPH/DIAG INJ SC/IM: CPT | Mod: ,,, | Performed by: FAMILY MEDICINE

## 2024-08-07 PROCEDURE — 1159F MED LIST DOCD IN RCRD: CPT | Mod: ,,, | Performed by: FAMILY MEDICINE

## 2024-08-07 PROCEDURE — 3044F HG A1C LEVEL LT 7.0%: CPT | Mod: ,,, | Performed by: FAMILY MEDICINE

## 2024-08-07 PROCEDURE — 3008F BODY MASS INDEX DOCD: CPT | Mod: ,,, | Performed by: FAMILY MEDICINE

## 2024-08-07 PROCEDURE — 99214 OFFICE O/P EST MOD 30 MIN: CPT | Mod: 25,,, | Performed by: FAMILY MEDICINE

## 2024-08-07 PROCEDURE — 3078F DIAST BP <80 MM HG: CPT | Mod: ,,, | Performed by: FAMILY MEDICINE

## 2024-08-07 PROCEDURE — 3074F SYST BP LT 130 MM HG: CPT | Mod: ,,, | Performed by: FAMILY MEDICINE

## 2024-08-07 PROCEDURE — 4010F ACE/ARB THERAPY RXD/TAKEN: CPT | Mod: ,,, | Performed by: FAMILY MEDICINE

## 2024-08-07 RX ORDER — METHYLPREDNISOLONE ACETATE 80 MG/ML
80 INJECTION, SUSPENSION INTRA-ARTICULAR; INTRALESIONAL; INTRAMUSCULAR; SOFT TISSUE
Status: COMPLETED | OUTPATIENT
Start: 2024-08-07 | End: 2024-08-07

## 2024-08-07 RX ORDER — DEXAMETHASONE SODIUM PHOSPHATE 4 MG/ML
4 INJECTION, SOLUTION INTRA-ARTICULAR; INTRALESIONAL; INTRAMUSCULAR; INTRAVENOUS; SOFT TISSUE
Status: COMPLETED | OUTPATIENT
Start: 2024-08-07 | End: 2024-08-07

## 2024-08-07 RX ORDER — LINCOMYCIN HYDROCHLORIDE 300 MG/ML
600 INJECTION, SOLUTION INTRAMUSCULAR; INTRAVENOUS; SUBCONJUNCTIVAL
Status: COMPLETED | OUTPATIENT
Start: 2024-08-07 | End: 2024-08-07

## 2024-08-07 RX ORDER — NIRMATRELVIR AND RITONAVIR 300-100 MG
KIT ORAL
Qty: 30 TABLET | Refills: 0 | Status: SHIPPED | OUTPATIENT
Start: 2024-08-07

## 2024-08-07 RX ORDER — PREDNISONE 20 MG/1
40 TABLET ORAL DAILY
Qty: 10 TABLET | Refills: 0 | Status: SHIPPED | OUTPATIENT
Start: 2024-08-07

## 2024-08-07 RX ORDER — AZITHROMYCIN 250 MG/1
TABLET, FILM COATED ORAL
Qty: 6 TABLET | Refills: 0 | Status: SHIPPED | OUTPATIENT
Start: 2024-08-07 | End: 2024-08-12

## 2024-08-07 RX ADMIN — METHYLPREDNISOLONE ACETATE 80 MG: 80 INJECTION, SUSPENSION INTRA-ARTICULAR; INTRALESIONAL; INTRAMUSCULAR; SOFT TISSUE at 11:08

## 2024-08-07 RX ADMIN — DEXAMETHASONE SODIUM PHOSPHATE 4 MG: 4 INJECTION, SOLUTION INTRA-ARTICULAR; INTRALESIONAL; INTRAMUSCULAR; INTRAVENOUS; SOFT TISSUE at 11:08

## 2024-08-07 RX ADMIN — LINCOMYCIN HYDROCHLORIDE 600 MG: 300 INJECTION, SOLUTION INTRAMUSCULAR; INTRAVENOUS; SUBCONJUNCTIVAL at 11:08

## 2024-08-09 DIAGNOSIS — Z71.89 COMPLEX CARE COORDINATION: ICD-10-CM

## 2024-08-26 ENCOUNTER — OFFICE VISIT (OUTPATIENT)
Dept: PRIMARY CARE CLINIC | Facility: CLINIC | Age: 74
End: 2024-08-26
Payer: MEDICARE

## 2024-08-26 VITALS
OXYGEN SATURATION: 98 % | SYSTOLIC BLOOD PRESSURE: 106 MMHG | HEART RATE: 80 BPM | BODY MASS INDEX: 24.65 KG/M2 | DIASTOLIC BLOOD PRESSURE: 70 MMHG | RESPIRATION RATE: 18 BRPM | WEIGHT: 186 LBS | HEIGHT: 73 IN

## 2024-08-26 DIAGNOSIS — J03.80 ACUTE TONSILLITIS DUE TO OTHER SPECIFIED ORGANISMS: ICD-10-CM

## 2024-08-26 DIAGNOSIS — E78.5 DYSLIPIDEMIA: ICD-10-CM

## 2024-08-26 DIAGNOSIS — I10 HYPERTENSION, UNSPECIFIED TYPE: Primary | ICD-10-CM

## 2024-08-26 DIAGNOSIS — E11.9 TYPE 2 DIABETES MELLITUS WITHOUT COMPLICATION, WITHOUT LONG-TERM CURRENT USE OF INSULIN: ICD-10-CM

## 2024-08-26 DIAGNOSIS — I48.11 LONGSTANDING PERSISTENT ATRIAL FIBRILLATION: ICD-10-CM

## 2024-08-26 DIAGNOSIS — C61 CANCER OF PROSTATE: ICD-10-CM

## 2024-08-26 PROCEDURE — 4010F ACE/ARB THERAPY RXD/TAKEN: CPT | Mod: ,,, | Performed by: FAMILY MEDICINE

## 2024-08-26 PROCEDURE — 99214 OFFICE O/P EST MOD 30 MIN: CPT | Mod: 25,,, | Performed by: FAMILY MEDICINE

## 2024-08-26 PROCEDURE — 3044F HG A1C LEVEL LT 7.0%: CPT | Mod: ,,, | Performed by: FAMILY MEDICINE

## 2024-08-26 PROCEDURE — 1101F PT FALLS ASSESS-DOCD LE1/YR: CPT | Mod: ,,, | Performed by: FAMILY MEDICINE

## 2024-08-26 PROCEDURE — 3288F FALL RISK ASSESSMENT DOCD: CPT | Mod: ,,, | Performed by: FAMILY MEDICINE

## 2024-08-26 PROCEDURE — 3078F DIAST BP <80 MM HG: CPT | Mod: ,,, | Performed by: FAMILY MEDICINE

## 2024-08-26 PROCEDURE — 3008F BODY MASS INDEX DOCD: CPT | Mod: ,,, | Performed by: FAMILY MEDICINE

## 2024-08-26 PROCEDURE — 3074F SYST BP LT 130 MM HG: CPT | Mod: ,,, | Performed by: FAMILY MEDICINE

## 2024-08-26 PROCEDURE — 96372 THER/PROPH/DIAG INJ SC/IM: CPT | Mod: ,,, | Performed by: FAMILY MEDICINE

## 2024-08-26 PROCEDURE — 1159F MED LIST DOCD IN RCRD: CPT | Mod: ,,, | Performed by: FAMILY MEDICINE

## 2024-08-26 RX ORDER — LINCOMYCIN HYDROCHLORIDE 300 MG/ML
600 INJECTION, SOLUTION INTRAMUSCULAR; INTRAVENOUS; SUBCONJUNCTIVAL
Status: COMPLETED | OUTPATIENT
Start: 2024-08-26 | End: 2024-08-26

## 2024-08-26 RX ORDER — PREDNISONE 20 MG/1
40 TABLET ORAL DAILY
Qty: 10 TABLET | Refills: 0 | Status: SHIPPED | OUTPATIENT
Start: 2024-08-26

## 2024-08-26 RX ORDER — DEXAMETHASONE SODIUM PHOSPHATE 4 MG/ML
4 INJECTION, SOLUTION INTRA-ARTICULAR; INTRALESIONAL; INTRAMUSCULAR; INTRAVENOUS; SOFT TISSUE
Status: COMPLETED | OUTPATIENT
Start: 2024-08-26 | End: 2024-08-26

## 2024-08-26 RX ORDER — AZITHROMYCIN 250 MG/1
TABLET, FILM COATED ORAL
Qty: 6 TABLET | Refills: 1 | Status: SHIPPED | OUTPATIENT
Start: 2024-08-26 | End: 2024-08-31

## 2024-08-26 RX ORDER — METHYLPREDNISOLONE ACETATE 80 MG/ML
80 INJECTION, SUSPENSION INTRA-ARTICULAR; INTRALESIONAL; INTRAMUSCULAR; SOFT TISSUE
Status: COMPLETED | OUTPATIENT
Start: 2024-08-26 | End: 2024-08-26

## 2024-08-26 RX ADMIN — LINCOMYCIN HYDROCHLORIDE 600 MG: 300 INJECTION, SOLUTION INTRAMUSCULAR; INTRAVENOUS; SUBCONJUNCTIVAL at 10:08

## 2024-08-26 RX ADMIN — DEXAMETHASONE SODIUM PHOSPHATE 4 MG: 4 INJECTION, SOLUTION INTRA-ARTICULAR; INTRALESIONAL; INTRAMUSCULAR; INTRAVENOUS; SOFT TISSUE at 10:08

## 2024-08-26 RX ADMIN — METHYLPREDNISOLONE ACETATE 80 MG: 80 INJECTION, SUSPENSION INTRA-ARTICULAR; INTRALESIONAL; INTRAMUSCULAR; SOFT TISSUE at 10:08

## 2024-08-26 NOTE — PROGRESS NOTES
Subjective:      Patient ID: Shad Olvera is a 74 y.o. male.    Chief Complaint: Dizziness and Nausea    Shad Olvera a 74 y.o. male presents for follow up on all regular problems which are reviewed and discussed.   Cough cold  Problem List Items Addressed This Visit          ENT    Acute tonsillitis due to other specified organisms       Cardiac/Vascular    Hypertension - Primary    Dyslipidemia    Unspecified atrial fibrillation    Overview     Noted by Feedjit  last documented on [id number]            Oncology    Cancer of prostate    Overview     CAP, GS 3+4.  PSA 5.5 at diagnosis, RALP 2/18/2020.  PSA prior to visit remains undetectable.  Lab Results   Component Value Date    PSA <0.010 01/19/2023    PSA <0.010 07/19/2022    PSA <0.010 01/17/2022               Endocrine    Type 2 diabetes mellitus without complication, without long-term current use of insulin       Past Medical History:  Past Medical History:   Diagnosis Date    Cancer of prostate     Chronic kidney disease, stage 3a 12/11/2023    Elevated PSA     Erectile dysfunction after radical prostatectomy 07/20/2022    Hypertension     Personal history of colonic polyps     Type 2 diabetes mellitus without complication, without long-term current use of insulin 12/11/2023     Past Surgical History:   Procedure Laterality Date    PROSTATE SURGERY      TRIGGER FINGER RELEASE       Review of patient's allergies indicates:  No Known Allergies  Current Outpatient Medications on File Prior to Visit   Medication Sig Dispense Refill    aspirin 81 MG Chew 1 tablet      atorvastatin (LIPITOR) 20 MG tablet Take 1 tablet (20 mg total) by mouth every evening. 90 tablet 3    diltiaZEM (CARDIZEM CD) 120 MG Cp24 Take 120 mg by mouth 2 (two) times daily.      empagliflozin (JARDIANCE) 25 mg tablet Take 1 tablet (25 mg total) by mouth once daily. 90 tablet 4    lansoprazole (PREVACID) 30 MG capsule TAKE 1 CAPSULE (30 MG TOTAL) BY MOUTH ONCE DAILY FOR ACID REFLUX 90  capsule 3    magnesium chloride (SLOW-MAG) 71.5 mg TbEC Take 64 mg by mouth once daily. 90 tablet 12    metoprolol succinate (TOPROL-XL) 50 MG 24 hr tablet Take 1 tablet (50 mg total) by mouth once daily. (Patient taking differently: Take 50 mg by mouth once daily. Take one tab in the am and a half in the pm) 90 tablet 3    travoprost (TRAVATAN Z) 0.004 % ophthalmic solution       valsartan (DIOVAN) 320 MG tablet Take 160 mg by mouth once daily.      vitamin D (VITAMIN D3) 1000 units Tab Take 2,000 Units by mouth once daily.      [DISCONTINUED] nirmatrelvir-ritonavir (PAXLOVID) 300 mg (150 mg x 2)-100 mg copackaged tablets (EUA) Take 3 tablets by mouth 2 (two) times daily. Each dose contains 2 nirmatrelvir (pink tablets) and 1 ritonavir (white tablet). Take all 3 tablets together 30 tablet 0    [DISCONTINUED] predniSONE (DELTASONE) 20 MG tablet Take 2 tablets (40 mg total) by mouth once daily. 10 tablet 0     No current facility-administered medications on file prior to visit.     Social History     Socioeconomic History    Marital status:    Tobacco Use    Smoking status: Never    Smokeless tobacco: Never   Substance and Sexual Activity    Alcohol use: Never    Drug use: Never    Sexual activity: Not Currently     Social Determinants of Health     Financial Resource Strain: Low Risk  (4/5/2021)    Overall Financial Resource Strain (CARDIA)     Difficulty of Paying Living Expenses: Not very hard   Food Insecurity: No Food Insecurity (6/8/2024)    Hunger Vital Sign     Worried About Running Out of Food in the Last Year: Never true     Ran Out of Food in the Last Year: Never true   Transportation Needs: Unknown (4/5/2021)    PRAPARE - Transportation     Lack of Transportation (Medical): No   Physical Activity: Insufficiently Active (6/8/2024)    Exercise Vital Sign     Days of Exercise per Week: 4 days     Minutes of Exercise per Session: 30 min   Stress: No Stress Concern Present (6/8/2024)    [place]  "[employer] [employer] - Occupational Stress Questionnaire     Feeling of Stress : Not at all   Housing Stability: Unknown (6/8/2024)    Housing Stability Vital Sign     Unable to Pay for Housing in the Last Year: No     Family History   Problem Relation Name Age of Onset    Heart disease Father roger     Stroke Father roger     Heart disease Mother jevon     Diabetes Mother jevon        Review of Systems   Constitutional: Negative.    HENT:  Negative for congestion, ear pain, nosebleeds and trouble swallowing.    Eyes:  Negative for pain and itching.   Respiratory:  Negative for chest tightness.    Cardiovascular:  Negative for chest pain.   Gastrointestinal:  Negative for abdominal distention.   Endocrine: Negative for cold intolerance and heat intolerance.   Genitourinary:  Negative for difficulty urinating.   Musculoskeletal:  Negative for arthralgias.   Neurological:  Negative for dizziness.     Objective:     /70 (BP Location: Left arm, Patient Position: Sitting, BP Method: Large (Manual))   Pulse 80   Resp 18   Ht 6' 1" (1.854 m)   Wt 84.4 kg (186 lb)   SpO2 98%   BMI 24.54 kg/m²     Physical Exam  Constitutional:       Appearance: Normal appearance. He is obese.   HENT:      Head: Normocephalic and atraumatic.      Right Ear: External ear normal.      Left Ear: External ear normal.      Nose: Nose normal.      Mouth/Throat:      Mouth: Mucous membranes are moist.      Pharynx: Oropharynx is clear.   Eyes:      Pupils: Pupils are equal, round, and reactive to light.   Cardiovascular:      Rate and Rhythm: Normal rate and regular rhythm.      Heart sounds: Normal heart sounds. No murmur heard.     No gallop.   Pulmonary:      Effort: Pulmonary effort is normal. No respiratory distress.      Breath sounds: Normal breath sounds. No wheezing.   Abdominal:      Palpations: Abdomen is soft.   Musculoskeletal:         General: Normal range of motion.      Cervical back: Normal range of motion " and neck supple.   Skin:     General: Skin is warm and dry.   Neurological:      General: No focal deficit present.      Mental Status: He is alert.   Psychiatric:         Mood and Affect: Mood normal.         Behavior: Behavior normal.         Thought Content: Thought content normal.         Judgment: Judgment normal.   Assessment:     1. Hypertension, unspecified type    2. Dyslipidemia    3. Longstanding persistent atrial fibrillation    4. Cancer of prostate    5. Type 2 diabetes mellitus without complication, without long-term current use of insulin    6. Acute tonsillitis due to other specified organisms        Plan:     Problem List Items Addressed This Visit          ENT    Acute tonsillitis due to other specified organisms       Cardiac/Vascular    Hypertension - Primary    Dyslipidemia    Unspecified atrial fibrillation       Oncology    Cancer of prostate       Endocrine    Type 2 diabetes mellitus without complication, without long-term current use of insulin     No follow-ups on file.  Shots from dx n6  Lab owo  6m fu    I have discontinued Shad Olvera's PAXLOVID and predniSONE. I am also having him start on azithromycin and predniSONE. Additionally, I am having him maintain his vitamin D, travoprost, atorvastatin, aspirin, valsartan, metoprolol succinate, diltiaZEM, empagliflozin, lansoprazole, and SLOW-MAG. We administered dexAMETHasone, methylPREDNISolone acetate, and lincomycin.    Shad was seen today for dizziness and nausea.    Diagnoses and all orders for this visit:    Hypertension, unspecified type    Dyslipidemia    Longstanding persistent atrial fibrillation    Cancer of prostate    Type 2 diabetes mellitus without complication, without long-term current use of insulin    Acute tonsillitis due to other specified organisms    Other orders  -     dexAMETHasone injection 4 mg  -     methylPREDNISolone acetate injection 80 mg  -     lincomycin injection 600 mg  -     azithromycin (Z-BRIDGET) 250 MG  tablet; Take 2 tablets by mouth on day 1; Take 1 tablet by mouth on days 2-5  -     predniSONE (DELTASONE) 20 MG tablet; Take 2 tablets (40 mg total) by mouth once daily.      Medications Ordered This Encounter   Medications    azithromycin (Z-BRIDGET) 250 MG tablet     Sig: Take 2 tablets by mouth on day 1; Take 1 tablet by mouth on days 2-5     Dispense:  6 tablet     Refill:  1    dexAMETHasone injection 4 mg    lincomycin injection 600 mg    methylPREDNISolone acetate injection 80 mg    predniSONE (DELTASONE) 20 MG tablet     Sig: Take 2 tablets (40 mg total) by mouth once daily.     Dispense:  10 tablet     Refill:  0     [unfilled]  No orders of the defined types were placed in this encounter.

## 2024-12-11 ENCOUNTER — OFFICE VISIT (OUTPATIENT)
Dept: FAMILY MEDICINE | Facility: CLINIC | Age: 74
End: 2024-12-11
Payer: MEDICARE

## 2024-12-11 VITALS
SYSTOLIC BLOOD PRESSURE: 127 MMHG | HEIGHT: 73 IN | HEART RATE: 79 BPM | OXYGEN SATURATION: 99 % | DIASTOLIC BLOOD PRESSURE: 82 MMHG | TEMPERATURE: 98 F | WEIGHT: 191 LBS | BODY MASS INDEX: 25.31 KG/M2

## 2024-12-11 DIAGNOSIS — E11.9 TYPE 2 DIABETES MELLITUS WITHOUT COMPLICATION, WITHOUT LONG-TERM CURRENT USE OF INSULIN: ICD-10-CM

## 2024-12-11 DIAGNOSIS — Z11.59 NEED FOR HEPATITIS C SCREENING TEST: ICD-10-CM

## 2024-12-11 DIAGNOSIS — E78.5 DYSLIPIDEMIA: ICD-10-CM

## 2024-12-11 DIAGNOSIS — N18.31 CHRONIC KIDNEY DISEASE, STAGE 3A: ICD-10-CM

## 2024-12-11 DIAGNOSIS — I10 HYPERTENSION, UNSPECIFIED TYPE: Primary | ICD-10-CM

## 2024-12-11 DIAGNOSIS — K21.00 GASTROESOPHAGEAL REFLUX DISEASE WITH ESOPHAGITIS, UNSPECIFIED WHETHER HEMORRHAGE: ICD-10-CM

## 2024-12-11 PROBLEM — U07.1 COVID: Status: RESOLVED | Noted: 2024-08-07 | Resolved: 2024-12-11

## 2024-12-11 PROBLEM — J03.80 ACUTE TONSILLITIS DUE TO OTHER SPECIFIED ORGANISMS: Status: RESOLVED | Noted: 2024-08-26 | Resolved: 2024-12-11

## 2024-12-11 LAB
ALBUMIN SERPL BCP-MCNC: 3.9 G/DL (ref 3.4–4.8)
ALBUMIN/GLOB SERPL: 1.3 {RATIO}
ALP SERPL-CCNC: 73 U/L (ref 40–150)
ALT SERPL W P-5'-P-CCNC: 15 U/L
ANION GAP SERPL CALCULATED.3IONS-SCNC: 10 MMOL/L (ref 7–16)
AST SERPL W P-5'-P-CCNC: 17 U/L (ref 5–34)
BASOPHILS # BLD AUTO: 0.02 K/UL (ref 0–0.2)
BASOPHILS NFR BLD AUTO: 0.3 % (ref 0–1)
BILIRUB SERPL-MCNC: 0.7 MG/DL
BUN SERPL-MCNC: 17 MG/DL (ref 8–26)
BUN/CREAT SERPL: 13 (ref 6–20)
CALCIUM SERPL-MCNC: 9.4 MG/DL (ref 8.8–10)
CHLORIDE SERPL-SCNC: 106 MMOL/L (ref 98–107)
CHOLEST SERPL-MCNC: 145 MG/DL
CHOLEST/HDLC SERPL: 3.5 {RATIO}
CO2 SERPL-SCNC: 25 MMOL/L (ref 23–31)
CREAT SERPL-MCNC: 1.3 MG/DL (ref 0.72–1.25)
CREAT UR-MCNC: 186 MG/DL (ref 23–375)
DIFFERENTIAL METHOD BLD: ABNORMAL
EGFR (NO RACE VARIABLE) (RUSH/TITUS): 58 ML/MIN/1.73M2
EOSINOPHIL # BLD AUTO: 0.08 K/UL (ref 0–0.5)
EOSINOPHIL NFR BLD AUTO: 1.4 % (ref 1–4)
ERYTHROCYTE [DISTWIDTH] IN BLOOD BY AUTOMATED COUNT: 12.8 % (ref 11.5–14.5)
EST. AVERAGE GLUCOSE BLD GHB EST-MCNC: 131 MG/DL
GLOBULIN SER-MCNC: 3.1 G/DL (ref 2–4)
GLUCOSE SERPL-MCNC: 92 MG/DL (ref 82–115)
HBA1C MFR BLD HPLC: 6.2 %
HCT VFR BLD AUTO: 46.3 % (ref 40–54)
HCV AB SER QL: NORMAL
HDLC SERPL-MCNC: 42 MG/DL (ref 35–60)
HGB BLD-MCNC: 14.1 G/DL (ref 13.5–18)
IMM GRANULOCYTES # BLD AUTO: 0.01 K/UL (ref 0–0.04)
IMM GRANULOCYTES NFR BLD: 0.2 % (ref 0–0.4)
LDLC SERPL CALC-MCNC: 92 MG/DL
LDLC/HDLC SERPL: 2.2 {RATIO}
LYMPHOCYTES # BLD AUTO: 1.49 K/UL (ref 1–4.8)
LYMPHOCYTES NFR BLD AUTO: 25.3 % (ref 27–41)
MCH RBC QN AUTO: 28.5 PG (ref 27–31)
MCHC RBC AUTO-ENTMCNC: 30.5 G/DL (ref 32–36)
MCV RBC AUTO: 93.5 FL (ref 80–96)
MICROALBUMIN UR-MCNC: 2.6 MG/DL
MICROALBUMIN/CREAT RATIO PNL UR: 14 MG/G (ref 0–30)
MONOCYTES # BLD AUTO: 0.69 K/UL (ref 0–0.8)
MONOCYTES NFR BLD AUTO: 11.7 % (ref 2–6)
MPC BLD CALC-MCNC: 10.6 FL (ref 9.4–12.4)
NEUTROPHILS # BLD AUTO: 3.61 K/UL (ref 1.8–7.7)
NEUTROPHILS NFR BLD AUTO: 61.1 % (ref 53–65)
NONHDLC SERPL-MCNC: 103 MG/DL
NRBC # BLD AUTO: 0 X10E3/UL
NRBC, AUTO (.00): 0 %
PLATELET # BLD AUTO: 226 K/UL (ref 150–400)
POTASSIUM SERPL-SCNC: 4 MMOL/L (ref 3.5–5.1)
PROT SERPL-MCNC: 7 G/DL (ref 5.8–7.6)
RBC # BLD AUTO: 4.95 M/UL (ref 4.6–6.2)
SODIUM SERPL-SCNC: 137 MMOL/L (ref 136–145)
TRIGL SERPL-MCNC: 55 MG/DL (ref 34–140)
TSH SERPL DL<=0.005 MIU/L-ACNC: 1.44 UIU/ML (ref 0.35–4.94)
VLDLC SERPL-MCNC: 11 MG/DL
WBC # BLD AUTO: 5.9 K/UL (ref 4.5–11)

## 2024-12-11 PROCEDURE — 85025 COMPLETE CBC W/AUTO DIFF WBC: CPT | Mod: ,,, | Performed by: CLINICAL MEDICAL LABORATORY

## 2024-12-11 PROCEDURE — 3074F SYST BP LT 130 MM HG: CPT | Mod: ,,, | Performed by: NURSE PRACTITIONER

## 2024-12-11 PROCEDURE — 80053 COMPREHEN METABOLIC PANEL: CPT | Mod: ,,, | Performed by: CLINICAL MEDICAL LABORATORY

## 2024-12-11 PROCEDURE — 3044F HG A1C LEVEL LT 7.0%: CPT | Mod: ,,, | Performed by: NURSE PRACTITIONER

## 2024-12-11 PROCEDURE — 3008F BODY MASS INDEX DOCD: CPT | Mod: ,,, | Performed by: NURSE PRACTITIONER

## 2024-12-11 PROCEDURE — 82570 ASSAY OF URINE CREATININE: CPT | Mod: ,,, | Performed by: CLINICAL MEDICAL LABORATORY

## 2024-12-11 PROCEDURE — 4010F ACE/ARB THERAPY RXD/TAKEN: CPT | Mod: ,,, | Performed by: NURSE PRACTITIONER

## 2024-12-11 PROCEDURE — 3079F DIAST BP 80-89 MM HG: CPT | Mod: ,,, | Performed by: NURSE PRACTITIONER

## 2024-12-11 PROCEDURE — 86803 HEPATITIS C AB TEST: CPT | Mod: ,,, | Performed by: CLINICAL MEDICAL LABORATORY

## 2024-12-11 PROCEDURE — 82043 UR ALBUMIN QUANTITATIVE: CPT | Mod: ,,, | Performed by: CLINICAL MEDICAL LABORATORY

## 2024-12-11 PROCEDURE — 1160F RVW MEDS BY RX/DR IN RCRD: CPT | Mod: ,,, | Performed by: NURSE PRACTITIONER

## 2024-12-11 PROCEDURE — 83036 HEMOGLOBIN GLYCOSYLATED A1C: CPT | Mod: ,,, | Performed by: CLINICAL MEDICAL LABORATORY

## 2024-12-11 PROCEDURE — 99214 OFFICE O/P EST MOD 30 MIN: CPT | Mod: ,,, | Performed by: NURSE PRACTITIONER

## 2024-12-11 PROCEDURE — 1159F MED LIST DOCD IN RCRD: CPT | Mod: ,,, | Performed by: NURSE PRACTITIONER

## 2024-12-11 PROCEDURE — 1126F AMNT PAIN NOTED NONE PRSNT: CPT | Mod: ,,, | Performed by: NURSE PRACTITIONER

## 2024-12-11 PROCEDURE — 80061 LIPID PANEL: CPT | Mod: ,,, | Performed by: CLINICAL MEDICAL LABORATORY

## 2024-12-11 PROCEDURE — 84443 ASSAY THYROID STIM HORMONE: CPT | Mod: ,,, | Performed by: CLINICAL MEDICAL LABORATORY

## 2024-12-11 RX ORDER — LANSOPRAZOLE 30 MG/1
30 CAPSULE, DELAYED RELEASE ORAL DAILY
Qty: 90 CAPSULE | Refills: 3 | Status: SHIPPED | OUTPATIENT
Start: 2024-12-11

## 2024-12-11 RX ORDER — ATORVASTATIN CALCIUM 20 MG/1
20 TABLET, FILM COATED ORAL NIGHTLY
Qty: 90 TABLET | Refills: 3 | Status: SHIPPED | OUTPATIENT
Start: 2024-12-11

## 2024-12-11 NOTE — PROGRESS NOTES
Subjective     Patient ID: Shad Olvera is a 74 y.o. male.    Chief Complaint: Establish Care    Pt presents to establish care.  Protective Sensation (w/ 10 gram monofilament):  Right: Intact  Left: Intact    Visual Inspection:  Normal -  Bilateral    Pedal Pulses:   Right: Present  Left: Present    Posterior Tibialis Pulses:   Right:Present  Left: Present         Review of Systems   Constitutional:  Negative for activity change, appetite change, fatigue and fever.   HENT:  Negative for nasal congestion, nosebleeds, postnasal drip, rhinorrhea, sinus pressure/congestion, sneezing and sore throat.    Eyes:  Negative for pain and itching.   Respiratory:  Negative for cough, chest tightness, shortness of breath, wheezing and stridor.    Cardiovascular:  Negative for chest pain.   Gastrointestinal:  Negative for abdominal pain.   Genitourinary:  Negative for dysuria.   Musculoskeletal:  Negative for back pain.   Neurological:  Negative for dizziness and headaches.   Psychiatric/Behavioral:  Negative for behavioral problems and confusion.           Objective     Physical Exam  Vitals and nursing note reviewed.   Constitutional:       Appearance: Normal appearance.   Cardiovascular:      Rate and Rhythm: Normal rate and regular rhythm.      Heart sounds: Normal heart sounds.   Pulmonary:      Effort: Pulmonary effort is normal.      Breath sounds: Normal breath sounds.   Musculoskeletal:         General: Normal range of motion.   Feet:      Right foot:      Protective Sensation: 10 sites tested.  10 sites sensed.      Left foot:      Protective Sensation: 10 sites tested.  10 sites sensed.   Neurological:      Mental Status: He is alert and oriented to person, place, and time.   Psychiatric:         Mood and Affect: Mood normal.         Behavior: Behavior normal.            Assessment and Plan     1. Hypertension, unspecified type    2. Chronic kidney disease, stage 3a    3. Type 2 diabetes mellitus without complication,  without long-term current use of insulin  -     empagliflozin (JARDIANCE) 25 mg tablet; Take 1 tablet (25 mg total) by mouth once daily.  Dispense: 90 tablet; Refill: 4  -     Microalbumin/Creatinine Ratio, Urine; Future; Expected date: 12/11/2024  -     Hemoglobin A1C; Future; Expected date: 12/11/2024  -     CBC Auto Differential; Future; Expected date: 12/11/2024  -     Comprehensive Metabolic Panel; Future; Expected date: 12/11/2024  -     TSH; Future; Expected date: 12/11/2024  -     Lipid Panel; Future; Expected date: 12/11/2024    4. Dyslipidemia  -     atorvastatin (LIPITOR) 20 MG tablet; Take 1 tablet (20 mg total) by mouth every evening.  Dispense: 90 tablet; Refill: 3    5. Gastroesophageal reflux disease with esophagitis, unspecified whether hemorrhage  -     lansoprazole (PREVACID) 30 MG capsule; Take 1 capsule (30 mg total) by mouth once daily.  Dispense: 90 capsule; Refill: 3    6. Need for hepatitis C screening test  -     Hepatitis C Antibody; Future; Expected date: 12/11/2024        Will call pt with lab results. Will obtain last eye exam from Dr. Noriega in [place] and last pneumonia vaccine from [facility].          Follow up in about 6 months (around 6/11/2025).

## 2024-12-12 ENCOUNTER — PATIENT OUTREACH (OUTPATIENT)
Facility: HOSPITAL | Age: 74
End: 2024-12-12
Payer: MEDICARE

## 2024-12-12 NOTE — PROGRESS NOTES
Population Health Chart Review & Patient Outreach Details    Updates Requested / Reviewed:  [x]  Care Team Updated  [x]   Reviewed  [x]  [facility] Reviewed    Health Maintenance Topics Addressed and Outreach Outcomes / Actions Taken:  Immunizations [x] HM Updated with vaccines abstracted from MIIX.               Diabetic Eye Exam [x] DINA sent to [facility].

## 2024-12-12 NOTE — LETTER
AUTHORIZATION FOR RELEASE OF   CONFIDENTIAL INFORMATION    Dear Dr. Noriega,    We are seeing Shad Olvera, date of birth 1950, in the clinic at [facility]. Alicia Smith FNP is the patient's PCP. Shad Olvera has an outstanding lab/procedure at the time we reviewed his chart. In order to help keep his health information updated, he has authorized us to request the following medical record(s):        (  )  MAMMOGRAM                                      (  )  COLONOSCOPY      (  )  PAP SMEAR                                          (  )  OUTSIDE LAB RESULTS     (  )  DEXA SCAN                                          ( x )  EYE EXAM            (  )  FOOT EXAM                                          (  )  ENTIRE RECORD     (  )  OUTSIDE IMMUNIZATIONS                 (  )  _______________         Please fax records to Reyes Guido LPN Care Coordinator at [phone].      If you have any questions, please contact Reyes at [phone].           Patient Name: Shad Olvera  : 1950  Patient Phone #: [phone]                Shad Olvera  MRN: [id number]  : 1950  Age: 74 y.o.  Sex: male         Patient/Legal Guardian Signature  This signature was collected at 2024           _______________________________   Printed Name/Relationship to Patient      Consent for Examination and Treatment: I hereby authorize the providers and employees of [facility] ([facility]) to provide medical treatment/services which includes, but is not limited to, performing and administering tests and diagnostic procedures that are deemed necessary, including, but not limited to, imaging examinations, blood tests and other laboratory procedures as may be required by the hospital, clinic, or may be ordered by my physician(s) or persons working under the general and/or special instructions of my physician(s).      I understand and agree that this consent covers all authorized persons, including  but not limited to physicians, residents, nurse practitioners, physicians' assistants, specialists, consultants, student nurses, and independently contracted physicians, who are called upon by the physician in charge, to carry out the diagnostic procedures and medical or surgical treatment.     I hereby authorize [facility] to retain or dispose of any specimens or tissue, should there be such remaining from any test or procedure.     I hereby authorize and give consent for [facility] providers and employees to take photographs, images or videotapes of such diagnostic, surgical or treatment procedures of Patient as may be required by [facility] or as may be ordered by a physician. I further acknowledge and agree that [facility] may use cameras or other devices for patient monitoring.     I am aware that the practice of medicine is not an exact science, and I acknowledge that no guarantees have been made to me as to the outcome of any tests, procedures or treatment.     Authorization for Release of Information: I understand that my insurance company and/or their agents may need information necessary to make determinations about payment/reimbursement. I hereby provide authorization to release to all insurance companies, their successors, assignees, other parties with whom they may have contracted, or others acting on their behalf, that are involved with payment for any hospital and/or clinic charges incurred by the patient, any information that they request and deem necessary for payment/reimbursement, and/or quality review.  I further authorize the release of my health information to physicians or other health care practitioners on staff who are involved in my health care now and in the future, and to other health care providers, entities, or institutions for the purpose of my continued care and treatment, including referrals.     REGISTRATION AUTHORIZATION  Form No. 36467 (Rev. 3/25/2024)    Page 1 of 3                        Medicare Patient's Certification and Authorization to Release Information and Payment Request:  I certify that the information given by me in applying for payment under Title XVIII of the Social Security Act is correct. I authorize any gutierrez of medical or other information about me to release to the Social SecurityAdministration, or its intermediaries or carriers, any information needed for this or a related Medicare claim. I request that payment of authorized benefits be made on my behalf.     Assignment of Insurance Benefits:   I hereby authorize any and all insurance companies, health plans, defined   benefit plans, health insurers or any entity that is or may be responsible for payment of my medical expenses to pay all hospital and medical benefits now due, and to become due and payable to me under any hospital benefits, sick benefits, injury benefits or any other benefit for services rendered to me, including Major Medical Benefits, direct to [facility] and all independently contracted physicians. I assign any and all rights that I may have against any and all insurance companies, health plans, defined benefit plans, health insurers or any entity that is or may be responsible for payment of my medical expenses, including, but not limited to any right to appeal a denial of a claim, any right to bring any action, lawsuit, administrative proceeding, or other cause of action on my behalf. I specifically assign my right to pursue litigation against any and all insurance companies, health plans, defined benefit plans, health insurers or any entity that is or may be responsible for payment of my medical expenses based upon a refusal to pay charges.            E. Valuables: It is understood and agreed that [facility] is not liable for the damage to or loss of any money, jewelry,   documents, dentures, eye glasses, hearing aids, prosthetics, or other property of value.     F. Computer Equipment: I understand and agree that  should I choose to use computer equipment owned by [facility] or if I choose to access the Internet via [facility]s network, I do so at my own risk. [facility] is not responsible for any damage to my computer equipment or to any damages of any type that might arise from my loss of equipment or data.     G. Acceptance of Financial Responsibility:  I agree that in consideration of the services and   supplies that have been   or will be furnished to the patient, I am hereby obligated to pay all charges made for or on the account of the patient according to the standard rates (in effect at the time the services and supplies are delivered) established by [facility], including its Patient Financial Assistance Policy to the extent it is applicable. I understand that I am responsible for all charges, or portions thereof, not covered by insurance or other sources. Patient refunds will be distributed only after balances at all [facility] facilities are paid.     H. Communication Authorization:  I hereby authorize [facility] and its representatives, along with any billing service   or  who may work on their behalf, to contact me on   my cell phone and/or home phone using pre- recorded messages, artificial voice messages, automatic telephone dialing devices or other computer assisted technology, or by electronic      mail, text messaging, or by any other form of electronic communication. This includes, but is not limited to, appointment reminders, yearly physical exam reminders, preventive care reminders, patient campaigns, welcome calls, and calls about account balances on my account or any account on which I am listed as a guarantor. I understand I have the right to opt out of these communications at any time.      Relationship  Between  Facility and  Provider:      I understand that some, but not all, providers furnishing services to the patient are not employees or agents of [facility]. The patient is under the care and  supervision of his/her attending physician, and it is the responsibility of the facility and its nursing staff to carry out the instructions of such physicians. It is the responsibility of the patient's physician/designee to obtain the patient's informed consent, when required, for medical or surgical treatment, special diagnostic or therapeutic procedures, or hospital services rendered for the patient under the special instructions of the physician/designee.           REGISTRATION AUTHORIZATION  Form No. 65456 (Rev. 3/25/2024)    Page 2 of 3                       Immunizations: [facility] shares immunization information with state sponsored health departments to help you and your doctor keep track of your immunization records. By signing, you consent to have this information shared with the health department in your state:                                [place] - LINKS ([place] Immunization Network for Kids Statewide)                                [place] - MIIX ([place] Immunization Information eXchange)                                [place] - ImmPRINT (Immunization Patient Registry with Integrated Technology)     TERM: This authorization is valid for this and subsequent care/treatment I receive at [facility] and will remain valid unless/until revoked in writing by me.     [facility]: As used in this document, [facility] means all [facility] owned and managed facilities, including, but not limited to, all health centers, surgery centers, clinics, urgent care centers, and hospitals.         [facility] complies with applicable Federal civil rights laws and does not discriminate on the basis of race, color, national origin, age, disability, or sex.  ATENCIÓN: si habla español, tiene a anderson disposición servicios gratuitos de asistencia lingüística. Madyson umaña [phone].  CHÚ Ý: N?u b?n nói Ti?ng Vi?t, có các d?ch v? h? tr? ngôn ng? mi?n phí dành cho b?n. G?i s? [phone].         REGISTRATION AUTHORIZATION  Form No. 47713 (Rev. 3/25/2024)   Page 3 of 3

## 2024-12-13 ENCOUNTER — PATIENT MESSAGE (OUTPATIENT)
Dept: FAMILY MEDICINE | Facility: CLINIC | Age: 74
End: 2024-12-13
Payer: MEDICARE

## 2024-12-30 NOTE — PROGRESS NOTES
Subjective     Patient ID: Shad Olvera is a 74 y.o. male.    Chief Complaint:  Prostate cancer follow-up    This pleasant 74 year old male presents to clinic for follow up of prostate cancer and ED. Patient states he isl doing good and denies any new Urological complaints.  His PSA done December 27, 2024 was immeasurable at 0.000.  He states he is delighted with the way he is voiding and desires to continue the current management.  His IPSS score was 7 that reflects frequency, intermittency, urgency, and nocturia 1 time a night.  He denies dysuria, hematuria, incomplete bladder emptying, weak stream, or straining to urinate.  He denies fever, chills, nausea, or vomiting.  He denies any  pains.  He states Dr. Melara did his RALP on February 18, 2020. Records indicate his pathology report showed  [id number], final 3+4 with 26% and negative margins. He does report ED since the prostate surgery and failed sildenafil and cialis. He has since had a pacemaker implant. I discussed with the patient again trying the vacuum device vs  implant. He desires to wait and discuss this with his wife further.  He will let me know when they are ready to further discuss this. He is not currently on any Urological medications.  Through shared decision-making with the patient, he desires to continue to monitor the PSA every 6 months.  He does not feel like he needs any help with voiding.  He desires no further intervention at this time for the erectile dysfunction.  I spent 30 minutes counseling this patient, reviewing the chart, imaging, and labs.           PSA History:               PSA     0.000   on 12/27/2024              PSA     <0.010 on 06/24/2024              PSA     <0.010 on 12/08/2023              PSA     <0.010 on 06/16/2023              PSA     <0.010 on 01/19/2023              PSA     <0.010 on 07/19/2022              PSA     <0.010 on 01/17/2022              PSA     <0.010 on 11/29/2021   ------------------------------------------------------------------------------------------------------------------  {December 31, 2024].           Review of Systems   Constitutional:  Negative for activity change and fever.   HENT:  Negative for hearing loss and trouble swallowing.    Eyes:  Negative for visual disturbance.   Respiratory:  Negative for cough, shortness of breath and wheezing.    Cardiovascular:  Negative for chest pain.   Gastrointestinal:  Negative for abdominal pain, diarrhea, nausea and vomiting.   Endocrine: Negative for polyuria.   Genitourinary:  Positive for erectile dysfunction, frequency and urgency. Negative for bladder incontinence, decreased urine volume, difficulty urinating, discharge, dysuria, enuresis, flank pain, genital sores, hematuria, penile pain, penile swelling, scrotal swelling and testicular pain.        Prostate cancer        Nocturia   Musculoskeletal:  Negative for back pain and gait problem.   Integumentary:  Negative for rash.   Neurological:  Negative for speech difficulty and weakness.   Psychiatric/Behavioral:  Negative for behavioral problems and confusion.           Objective     Physical Exam  Vitals and nursing note reviewed.   Constitutional:       General: He is not in acute distress.     Appearance: Normal appearance. He is not ill-appearing, toxic-appearing or diaphoretic.   HENT:      Head: Normocephalic.   Eyes:      Extraocular Movements: Extraocular movements intact.   Cardiovascular:      Rate and Rhythm: Normal rate and regular rhythm.      Heart sounds: Normal heart sounds.   Pulmonary:      Effort: Pulmonary effort is normal. No respiratory distress.      Breath sounds: Normal breath sounds. No wheezing, rhonchi or rales.   Abdominal:      General: Bowel sounds are normal.      Palpations: Abdomen is soft.      Tenderness: There is no abdominal tenderness. There is no right CVA tenderness, left CVA tenderness, guarding or rebound.   Musculoskeletal:          General: Normal range of motion.      Cervical back: Normal range of motion. No rigidity.   Skin:     General: Skin is warm and dry.   Neurological:      General: No focal deficit present.      Mental Status: He is alert and oriented to person, place, and time.      Motor: No weakness.      Coordination: Coordination normal.      Gait: Gait normal.   Psychiatric:         Mood and Affect: Mood normal.         Behavior: Behavior normal.         Thought Content: Thought content normal.          Assessment and Plan     1. Cancer of prostate  Overview:  CAP, GS 3+4.  PSA 5.5 at diagnosis, RALP 2/18/2020.  PSA prior to visit remains undetectable.  Lab Results   Component Value Date    PSA <0.010 01/19/2023    PSA <0.010 07/19/2022    PSA <0.010 01/17/2022       Orders:  -     PSA, Total (Diagnostic); Future; Expected date: 06/30/2025    2. Erectile dysfunction after radical prostatectomy  Overview:  SILDENAFIL AND CIALIS WERE INEFFECTIVE      3. Nocturia    4. Frequency of urination    5. Urgency of urination             1. PSA in 6 months  2. Patient is pleased with the way he is voiding  3. Kegel exercises  4. Patient desires more time to consider the penile pump  5. Follow-up with urology NP KHOI Alva in 6 months

## 2024-12-31 ENCOUNTER — OFFICE VISIT (OUTPATIENT)
Dept: UROLOGY | Facility: CLINIC | Age: 74
End: 2024-12-31
Payer: MEDICARE

## 2024-12-31 VITALS
HEART RATE: 75 BPM | RESPIRATION RATE: 16 BRPM | DIASTOLIC BLOOD PRESSURE: 90 MMHG | BODY MASS INDEX: 24.8 KG/M2 | SYSTOLIC BLOOD PRESSURE: 133 MMHG | TEMPERATURE: 98 F | WEIGHT: 188 LBS

## 2024-12-31 DIAGNOSIS — C61 CANCER OF PROSTATE: Primary | Chronic | ICD-10-CM

## 2024-12-31 DIAGNOSIS — R35.0 FREQUENCY OF URINATION: ICD-10-CM

## 2024-12-31 DIAGNOSIS — N52.31 ERECTILE DYSFUNCTION AFTER RADICAL PROSTATECTOMY: Chronic | ICD-10-CM

## 2024-12-31 DIAGNOSIS — R39.15 URGENCY OF URINATION: ICD-10-CM

## 2024-12-31 DIAGNOSIS — R35.1 NOCTURIA: ICD-10-CM

## 2024-12-31 PROCEDURE — 99214 OFFICE O/P EST MOD 30 MIN: CPT | Mod: S$PBB,,, | Performed by: NURSE PRACTITIONER

## 2024-12-31 PROCEDURE — 3080F DIAST BP >= 90 MM HG: CPT | Mod: CPTII,,, | Performed by: NURSE PRACTITIONER

## 2024-12-31 PROCEDURE — 1159F MED LIST DOCD IN RCRD: CPT | Mod: CPTII,,, | Performed by: NURSE PRACTITIONER

## 2024-12-31 PROCEDURE — 99999 PR PBB SHADOW E&M-EST. PATIENT-LVL IV: CPT | Mod: PBBFAC,,, | Performed by: NURSE PRACTITIONER

## 2024-12-31 PROCEDURE — 99214 OFFICE O/P EST MOD 30 MIN: CPT | Mod: PBBFAC | Performed by: NURSE PRACTITIONER

## 2024-12-31 PROCEDURE — 1160F RVW MEDS BY RX/DR IN RCRD: CPT | Mod: CPTII,,, | Performed by: NURSE PRACTITIONER

## 2024-12-31 PROCEDURE — 3008F BODY MASS INDEX DOCD: CPT | Mod: CPTII,,, | Performed by: NURSE PRACTITIONER

## 2024-12-31 PROCEDURE — 3075F SYST BP GE 130 - 139MM HG: CPT | Mod: CPTII,,, | Performed by: NURSE PRACTITIONER

## 2024-12-31 NOTE — PATIENT INSTRUCTIONS
1. PSA in 6 months  2. Patient is pleased with the way he is voiding  3. Kegel exercises  4. Patient desires more time to consider the penile pump  5. Follow-up with urology LAZARO Alva in 6 months

## 2025-06-04 ENCOUNTER — OFFICE VISIT (OUTPATIENT)
Dept: FAMILY MEDICINE | Facility: CLINIC | Age: 75
End: 2025-06-04
Payer: MEDICARE

## 2025-06-04 ENCOUNTER — PATIENT MESSAGE (OUTPATIENT)
Dept: FAMILY MEDICINE | Facility: CLINIC | Age: 75
End: 2025-06-04
Payer: MEDICARE

## 2025-06-04 ENCOUNTER — PATIENT OUTREACH (OUTPATIENT)
Facility: HOSPITAL | Age: 75
End: 2025-06-04
Payer: MEDICARE

## 2025-06-04 VITALS
WEIGHT: 188.38 LBS | BODY MASS INDEX: 24.97 KG/M2 | HEIGHT: 73 IN | HEART RATE: 64 BPM | SYSTOLIC BLOOD PRESSURE: 134 MMHG | DIASTOLIC BLOOD PRESSURE: 82 MMHG | OXYGEN SATURATION: 98 % | TEMPERATURE: 98 F

## 2025-06-04 DIAGNOSIS — N18.31 CHRONIC KIDNEY DISEASE, STAGE 3A: ICD-10-CM

## 2025-06-04 DIAGNOSIS — Z23 NEED FOR VACCINATION: ICD-10-CM

## 2025-06-04 DIAGNOSIS — I48.11 LONGSTANDING PERSISTENT ATRIAL FIBRILLATION: ICD-10-CM

## 2025-06-04 DIAGNOSIS — E78.5 DYSLIPIDEMIA: ICD-10-CM

## 2025-06-04 DIAGNOSIS — E11.9 TYPE 2 DIABETES MELLITUS WITHOUT COMPLICATION, WITHOUT LONG-TERM CURRENT USE OF INSULIN: ICD-10-CM

## 2025-06-04 DIAGNOSIS — I10 HYPERTENSION, UNSPECIFIED TYPE: Primary | ICD-10-CM

## 2025-06-04 DIAGNOSIS — K21.00 GASTROESOPHAGEAL REFLUX DISEASE WITH ESOPHAGITIS, UNSPECIFIED WHETHER HEMORRHAGE: ICD-10-CM

## 2025-06-04 PROBLEM — R39.15 URGENCY OF URINATION: Status: RESOLVED | Noted: 2024-12-31 | Resolved: 2025-06-04

## 2025-06-04 PROBLEM — R35.0 FREQUENCY OF URINATION: Status: RESOLVED | Noted: 2024-12-31 | Resolved: 2025-06-04

## 2025-06-04 LAB
ALBUMIN SERPL BCP-MCNC: 3.8 G/DL (ref 3.4–4.8)
ALBUMIN/GLOB SERPL: 1.1 {RATIO}
ALP SERPL-CCNC: 65 U/L (ref 40–150)
ALT SERPL W P-5'-P-CCNC: 18 U/L
ANION GAP SERPL CALCULATED.3IONS-SCNC: 9 MMOL/L (ref 7–16)
AST SERPL W P-5'-P-CCNC: 14 U/L (ref 11–45)
BASOPHILS # BLD AUTO: 0.02 K/UL (ref 0–0.2)
BASOPHILS NFR BLD AUTO: 0.4 % (ref 0–1)
BILIRUB SERPL-MCNC: 0.3 MG/DL
BUN SERPL-MCNC: 15 MG/DL (ref 8–26)
BUN/CREAT SERPL: 11 (ref 6–20)
CALCIUM SERPL-MCNC: 9.7 MG/DL (ref 8.8–10)
CHLORIDE SERPL-SCNC: 107 MMOL/L (ref 98–107)
CHOLEST SERPL-MCNC: 131 MG/DL
CHOLEST/HDLC SERPL: 3.1 {RATIO}
CO2 SERPL-SCNC: 30 MMOL/L (ref 23–31)
CREAT SERPL-MCNC: 1.42 MG/DL (ref 0.72–1.25)
CREAT UR-MCNC: 96 MG/DL (ref 23–375)
DIFFERENTIAL METHOD BLD: ABNORMAL
EGFR (NO RACE VARIABLE) (RUSH/TITUS): 52 ML/MIN/1.73M2
EOSINOPHIL # BLD AUTO: 0.1 K/UL (ref 0–0.5)
EOSINOPHIL NFR BLD AUTO: 2 % (ref 1–4)
ERYTHROCYTE [DISTWIDTH] IN BLOOD BY AUTOMATED COUNT: 13.2 % (ref 11.5–14.5)
EST. AVERAGE GLUCOSE BLD GHB EST-MCNC: 134 MG/DL
GLOBULIN SER-MCNC: 3.4 G/DL (ref 2–4)
GLUCOSE SERPL-MCNC: 107 MG/DL (ref 82–115)
HBA1C MFR BLD HPLC: 6.3 %
HCT VFR BLD AUTO: 45.1 % (ref 40–54)
HDLC SERPL-MCNC: 42 MG/DL (ref 35–60)
HGB BLD-MCNC: 13.6 G/DL (ref 13.5–18)
IMM GRANULOCYTES # BLD AUTO: 0.01 K/UL (ref 0–0.04)
IMM GRANULOCYTES NFR BLD: 0.2 % (ref 0–0.4)
LDLC SERPL CALC-MCNC: 78 MG/DL
LDLC/HDLC SERPL: 1.9 {RATIO}
LYMPHOCYTES # BLD AUTO: 1.6 K/UL (ref 1–4.8)
LYMPHOCYTES NFR BLD AUTO: 31.9 % (ref 27–41)
MCH RBC QN AUTO: 27.3 PG (ref 27–31)
MCHC RBC AUTO-ENTMCNC: 30.2 G/DL (ref 32–36)
MCV RBC AUTO: 90.6 FL (ref 80–96)
MICROALBUMIN UR-MCNC: 0.7 MG/DL
MICROALBUMIN/CREAT RATIO PNL UR: 7.3 MG/G (ref 0–30)
MONOCYTES # BLD AUTO: 0.52 K/UL (ref 0–0.8)
MONOCYTES NFR BLD AUTO: 10.4 % (ref 2–6)
MPC BLD CALC-MCNC: 10.2 FL (ref 9.4–12.4)
NEUTROPHILS # BLD AUTO: 2.77 K/UL (ref 1.8–7.7)
NEUTROPHILS NFR BLD AUTO: 55.1 % (ref 53–65)
NONHDLC SERPL-MCNC: 89 MG/DL
NRBC # BLD AUTO: 0 X10E3/UL
NRBC, AUTO (.00): 0 %
PLATELET # BLD AUTO: 234 K/UL (ref 150–400)
POTASSIUM SERPL-SCNC: 4.2 MMOL/L (ref 3.5–5.1)
PROT SERPL-MCNC: 7.2 G/DL (ref 5.8–7.6)
RBC # BLD AUTO: 4.98 M/UL (ref 4.6–6.2)
SODIUM SERPL-SCNC: 142 MMOL/L (ref 136–145)
TRIGL SERPL-MCNC: 57 MG/DL (ref 34–140)
TSH SERPL DL<=0.005 MIU/L-ACNC: 1.41 UIU/ML (ref 0.35–4.94)
VLDLC SERPL-MCNC: 11 MG/DL
WBC # BLD AUTO: 5.02 K/UL (ref 4.5–11)

## 2025-06-04 PROCEDURE — 82570 ASSAY OF URINE CREATININE: CPT | Mod: ,,, | Performed by: CLINICAL MEDICAL LABORATORY

## 2025-06-04 PROCEDURE — 4010F ACE/ARB THERAPY RXD/TAKEN: CPT | Mod: ,,, | Performed by: NURSE PRACTITIONER

## 2025-06-04 PROCEDURE — 1126F AMNT PAIN NOTED NONE PRSNT: CPT | Mod: ,,, | Performed by: NURSE PRACTITIONER

## 2025-06-04 PROCEDURE — 80061 LIPID PANEL: CPT | Mod: ,,, | Performed by: CLINICAL MEDICAL LABORATORY

## 2025-06-04 PROCEDURE — 84443 ASSAY THYROID STIM HORMONE: CPT | Mod: ,,, | Performed by: CLINICAL MEDICAL LABORATORY

## 2025-06-04 PROCEDURE — G0009 ADMIN PNEUMOCOCCAL VACCINE: HCPCS | Mod: ,,, | Performed by: NURSE PRACTITIONER

## 2025-06-04 PROCEDURE — 1101F PT FALLS ASSESS-DOCD LE1/YR: CPT | Mod: ,,, | Performed by: NURSE PRACTITIONER

## 2025-06-04 PROCEDURE — 99214 OFFICE O/P EST MOD 30 MIN: CPT | Mod: 25,,, | Performed by: NURSE PRACTITIONER

## 2025-06-04 PROCEDURE — 80053 COMPREHEN METABOLIC PANEL: CPT | Mod: ,,, | Performed by: CLINICAL MEDICAL LABORATORY

## 2025-06-04 PROCEDURE — 3288F FALL RISK ASSESSMENT DOCD: CPT | Mod: ,,, | Performed by: NURSE PRACTITIONER

## 2025-06-04 PROCEDURE — 1160F RVW MEDS BY RX/DR IN RCRD: CPT | Mod: ,,, | Performed by: NURSE PRACTITIONER

## 2025-06-04 PROCEDURE — 3079F DIAST BP 80-89 MM HG: CPT | Mod: ,,, | Performed by: NURSE PRACTITIONER

## 2025-06-04 PROCEDURE — 85025 COMPLETE CBC W/AUTO DIFF WBC: CPT | Mod: ,,, | Performed by: CLINICAL MEDICAL LABORATORY

## 2025-06-04 PROCEDURE — 83036 HEMOGLOBIN GLYCOSYLATED A1C: CPT | Mod: ,,, | Performed by: CLINICAL MEDICAL LABORATORY

## 2025-06-04 PROCEDURE — 82043 UR ALBUMIN QUANTITATIVE: CPT | Mod: ,,, | Performed by: CLINICAL MEDICAL LABORATORY

## 2025-06-04 PROCEDURE — 3075F SYST BP GE 130 - 139MM HG: CPT | Mod: ,,, | Performed by: NURSE PRACTITIONER

## 2025-06-04 PROCEDURE — 1159F MED LIST DOCD IN RCRD: CPT | Mod: ,,, | Performed by: NURSE PRACTITIONER

## 2025-06-04 PROCEDURE — 90677 PCV20 VACCINE IM: CPT | Mod: ,,, | Performed by: NURSE PRACTITIONER

## 2025-06-04 PROCEDURE — 3008F BODY MASS INDEX DOCD: CPT | Mod: ,,, | Performed by: NURSE PRACTITIONER

## 2025-06-10 DIAGNOSIS — C61 CANCER OF PROSTATE: Primary | ICD-10-CM

## 2025-08-29 ENCOUNTER — PATIENT MESSAGE (OUTPATIENT)
Facility: HOSPITAL | Age: 75
End: 2025-08-29
Payer: MEDICARE